# Patient Record
Sex: FEMALE | Race: BLACK OR AFRICAN AMERICAN | Employment: FULL TIME | URBAN - METROPOLITAN AREA
[De-identification: names, ages, dates, MRNs, and addresses within clinical notes are randomized per-mention and may not be internally consistent; named-entity substitution may affect disease eponyms.]

---

## 2017-01-10 ENCOUNTER — OFFICE VISIT (OUTPATIENT)
Dept: CARDIOLOGY CLINIC | Age: 52
End: 2017-01-10

## 2017-01-10 VITALS
HEART RATE: 83 BPM | BODY MASS INDEX: 30.58 KG/M2 | DIASTOLIC BLOOD PRESSURE: 100 MMHG | SYSTOLIC BLOOD PRESSURE: 159 MMHG | WEIGHT: 162 LBS | HEIGHT: 61 IN

## 2017-01-10 DIAGNOSIS — R07.89 OTHER CHEST PAIN: Primary | ICD-10-CM

## 2017-01-10 DIAGNOSIS — E66.9 OBESITY (BMI 30.0-34.9): ICD-10-CM

## 2017-01-10 DIAGNOSIS — R03.0 PREHYPERTENSION: ICD-10-CM

## 2017-01-10 DIAGNOSIS — R94.31 ABNORMAL EKG: ICD-10-CM

## 2017-01-10 DIAGNOSIS — Z82.49 FH: CAD (CORONARY ARTERY DISEASE): ICD-10-CM

## 2017-01-10 PROBLEM — R07.9 CHEST PAIN: Status: ACTIVE | Noted: 2017-01-10

## 2017-01-10 RX ORDER — NITROGLYCERIN 0.4 MG/1
0.4 TABLET SUBLINGUAL
Qty: 25 TAB | Refills: 0 | Status: SHIPPED | OUTPATIENT
Start: 2017-01-10

## 2017-01-10 RX ORDER — TRAMADOL HYDROCHLORIDE 50 MG/1
50 TABLET ORAL 3 TIMES DAILY
Qty: 20 TAB | Refills: 0 | Status: SHIPPED | OUTPATIENT
Start: 2017-01-10 | End: 2017-02-06 | Stop reason: SDUPTHER

## 2017-01-10 RX ORDER — CARVEDILOL 6.25 MG/1
6.25 TABLET ORAL 2 TIMES DAILY WITH MEALS
Qty: 60 TAB | Refills: 3 | Status: SHIPPED | OUTPATIENT
Start: 2017-01-10 | End: 2017-02-06 | Stop reason: SDUPTHER

## 2017-01-10 NOTE — PROGRESS NOTES
HISTORY OF PRESENT ILLNESS  Brittani Poole is a 49 y.o. female. New Patient   The history is provided by the patient and medical records. Associated symptoms include chest pain and shortness of breath. Pertinent negatives include no headaches. Chest Pain (Angina)    The history is provided by the patient. This is a new problem. The current episode started more than 1 week ago (1/16). The problem has been gradually worsening (2wks). Duration of episode(s) is 15 minutes. The problem occurs every several days. The pain is associated with rest, normal activity and movement. The pain is present in the substernal region. The quality of the pain is described as vice-like and tightness. The pain radiates to the left jaw, right jaw, mid back and left arm. Associated symptoms include diaphoresis (mild sweating), palpitations and shortness of breath. Pertinent negatives include no claudication, no cough, no dizziness, no fever, no headaches, no malaise/fatigue, no nausea, no orthopnea, no PND and no vomiting. She has tried nothing for the symptoms. Palpitations    The history is provided by the patient. This is a new problem. The current episode started more than 1 week ago (yrs). The problem is associated with nothing. Associated symptoms include diaphoresis (mild sweating), chest pain and shortness of breath. Pertinent negatives include no fever, no malaise/fatigue, no claudication, no orthopnea, no PND, no nausea, no vomiting, no headaches, no dizziness and no cough. Review of Systems   Constitutional: Positive for diaphoresis (mild sweating). Negative for chills, fever, malaise/fatigue and weight loss. HENT: Negative for nosebleeds. Eyes: Negative for discharge. Respiratory: Positive for shortness of breath. Negative for cough and wheezing. Cardiovascular: Positive for chest pain and palpitations. Negative for orthopnea, claudication, leg swelling and PND.    Gastrointestinal: Negative for diarrhea, nausea and vomiting. Genitourinary: Negative for dysuria and hematuria. Musculoskeletal: Negative for joint pain. Skin: Negative for rash. Neurological: Negative for dizziness, seizures, loss of consciousness and headaches. Endo/Heme/Allergies: Negative for polydipsia. Does not bruise/bleed easily. Psychiatric/Behavioral: Negative for depression and substance abuse. The patient does not have insomnia. No Known Allergies    Past Medical History   Diagnosis Date    Atrial fibrillation ([facility] [address] 1992    Thyroid disease        Family History   Problem Relation Age of Onset    Heart Attack Father 48    Stroke Father     Heart Surgery Father        Social History   Substance Use Topics    Smoking status: Former Smoker     Quit date: 1/10/1982    Smokeless tobacco: None    Alcohol use Yes      Comment: rare wine glass <1/month        Current Outpatient Prescriptions   Medication Sig    ASPIRIN (ASPIR-LOW PO) Take  by mouth daily. No current facility-administered medications for this visit. Past Surgical History   Procedure Laterality Date    Hx partial thyroidectomy  2014     nodule with trach shift       Diagnostic Studies:  I have reviewed the relevant tests done on the patient and show as follows  EKG tracings reviewed by me today. No flowsheet data found. Visit Vitals    BP (!) 159/100    Pulse 83    Ht 5' 1\" (1.549 m)    Wt 73.5 kg (162 lb)    BMI 30.61 kg/m2       Ms. Carolyn Delacruz has a reminder for a \"due or due soon\" health maintenance. I have asked that she contact her primary care provider for follow-up on this health maintenance. Physical Exam   Constitutional: She is oriented to person, place, and time. She appears well-developed and well-nourished. No distress. HENT:   Head: Normocephalic and atraumatic. Mouth/Throat: Normal dentition. Eyes: Right eye exhibits no discharge. Left eye exhibits no discharge. No scleral icterus. Neck: Neck supple.  No JVD present. Carotid bruit is not present. No thyromegaly present. Cardiovascular: Normal rate, regular rhythm, S1 normal, S2 normal, normal heart sounds and intact distal pulses. Exam reveals no gallop and no friction rub. No murmur heard. Pulmonary/Chest: Effort normal and breath sounds normal. She has no wheezes. She has no rales. She exhibits tenderness (lower sternal and right lower parasternal). Abdominal: Soft. She exhibits no mass. There is no tenderness. Musculoskeletal: She exhibits no edema. Lymphadenopathy:        Right cervical: No superficial cervical adenopathy present. Left cervical: No superficial cervical adenopathy present. Neurological: She is alert and oriented to person, place, and time. Skin: Skin is warm and dry. No rash noted. Psychiatric: She has a normal mood and affect. Her behavior is normal.       ASSESSMENT and PLAN      Amanda was seen today for new patient, chest pain (angina) and palpitations. Diagnoses and all orders for this visit:    Other chest pain  Comments:  atypical but poss new angina  try tramadol if asa not helping and get stress test  Orders:  -     AMB POC EKG ROUTINE W/ 12 LEADS, INTER & REP  -     nitroglycerin (NITROSTAT) 0.4 mg SL tablet; 1 Tab by SubLINGual route every five (5) minutes as needed for Chest Pain for up to 3 doses. -     traMADol (ULTRAM) 50 mg tablet; Take 1 Tab by mouth three (3) times daily. Max Daily Amount: 150 mg. Indications: PAIN  -     SCHEDULE NUCLEAR STUDY    Obesity (BMI 30.0-34. 9)  Comments:  Weight loss has been strongly encouraged by following dietary restrictions and an exercise routine. Prehypertension  Comments:  high today and has been in past  Orders:  -     carvedilol (COREG) 6.25 mg tablet; Take 1 Tab by mouth two (2) times daily (with meals).     FH: CAD (coronary artery disease)  Comments:  father with premature CAD    Abnormal EKG  Comments:  non sp T wave changes        Pertinent laboratory and test data reviewed and discussed with patient. See patient instructions also for other medical advice given    There are no discontinued medications.     Follow-up Disposition:  Return in about 2 weeks (around 1/24/2017), or if symptoms worsen or fail to improve, for post test.

## 2017-01-10 NOTE — PATIENT INSTRUCTIONS
There are no discontinued medications. Orders Placed This Encounter    SCHEDULE NUCLEAR STUDY     exercise    AMB POC EKG ROUTINE W/ 12 LEADS, INTER & REP     Order Specific Question:   Reason for Exam:     Answer:   chest pain    carvedilol (COREG) 6.25 mg tablet     Sig: Take 1 Tab by mouth two (2) times daily (with meals). Dispense:  60 Tab     Refill:  3    nitroglycerin (NITROSTAT) 0.4 mg SL tablet     Si Tab by SubLINGual route every five (5) minutes as needed for Chest Pain for up to 3 doses. Dispense:  25 Tab     Refill:  0    traMADol (ULTRAM) 50 mg tablet     Sig: Take 1 Tab by mouth three (3) times daily. Max Daily Amount: 150 mg. Indications: PAIN     Dispense:  20 Tab     Refill:  0          Body Mass Index: Care Instructions  Your Care Instructions    Body mass index (BMI) can help you see if your weight is raising your risk for health problems. It uses a formula to compare how much you weigh with how tall you are. A BMI between 18.5 and 24.9 is considered healthy. A BMI between 25 and 29.9 is considered overweight. A BMI of 30 or higher is considered obese. If your BMI is in the normal range, it means that you have a lower risk for weight-related health problems. If your BMI is in the overweight or obese range, you may be at increased risk for weight-related health problems, such as high blood pressure, heart disease, stroke, arthritis or joint pain, and diabetes. BMI is just one measure of your risk for weight-related health problems. You may be at higher risk for health problems if you are not active, you eat an unhealthy diet, or you drink too much alcohol or use tobacco products. Follow-up care is a key part of your treatment and safety. Be sure to make and go to all appointments, and call your doctor if you are having problems. It's also a good idea to know your test results and keep a list of the medicines you take. How can you care for yourself at home?   · Practice healthy eating habits. This includes eating plenty of fruits, vegetables, whole grains, lean protein, and low-fat dairy. · Get at least 30 minutes of exercise 5 days a week or more. Brisk walking is a good choice. You also may want to do other activities, such as running, swimming, cycling, or playing tennis or team sports. · Do not smoke. Smoking can increase your risk for health problems. If you need help quitting, talk to your doctor about stop-smoking programs and medicines. These can increase your chances of quitting for good. · Limit alcohol to 2 drinks a day for men and 1 drink a day for women. Too much alcohol can cause health problems. If you have a BMI higher than 25  · Your doctor may do other tests to check your risk for weight-related health problems. This may include measuring the distance around your waist. A waist measurement of more than 40 inches in men or 35 inches in women can increase the risk of weight-related health problems. · Talk with your doctor about steps you can take to stay healthy or improve your health. You may need to make lifestyle changes to lose weight and stay healthy, such as changing your diet and getting regular exercise. Where can you learn more? Go to http://deedee-jacklyn.info/. Enter S176 in the search box to learn more about \"Body Mass Index: Care Instructions. \"  Current as of: February 16, 2016  Content Version: 11.1  © 5439-7216 [employer]. Care instructions adapted under license by [employer] (which disclaims liability or warranty for this information). If you have questions about a medical condition or this instruction, always ask your healthcare professional. [place] [address] any warranty or liability for your use of this information.

## 2017-01-10 NOTE — MR AVS SNAPSHOT
Visit Information Date & Time Provider Department Dept. Phone Encounter #  
 1/10/2017  9:15 AM Audrey Mcdaniel MD [facility] ([place]) [phone] [id number] Follow-up Instructions Return in about 2 weeks (around 1/24/2017), or if symptoms worsen or fail to improve, for post test.  
  
Your Appointments 1/12/2017  9:30 AM  
PROCEDURE with CA NUC [facility] ([facility]) Appt Note: [address] [facility] [address]  
  
   
 [address] [id number] [address] [address]  
  
    
 1/27/2017  9:45 AM  
ESTABLISHED PATIENT with Audrey Mcdaniel MD  
[facility] ([facility]) Appt Note: 2 week post nuclear follow up  
 [address] [facility] [address]  
  
   
 [address] [id number] [address] [address] Upcoming Health Maintenance Date Due DTaP/Tdap/Td series (1 - Tdap) 3/7/1987 PAP AKA CERVICAL CYTOLOGY 3/7/1987 BREAST CANCER SCRN MAMMOGRAM 3/7/2016 FOBT Q 1 YEAR AGE 50-75 3/7/2016 INFLUENZA AGE 9 TO ADULT 8/1/2016 Allergies as of 1/10/2017  Review Complete On: 1/10/2017 By: Audrey Mcdaniel MD  
 No Known Allergies Current Immunizations  Never Reviewed No immunizations on file. Not reviewed this visit You Were Diagnosed With   
  
 Codes Comments Other chest pain    -  Primary ICD-10-CM: R07.89 ICD-9-CM: 786.59 atypical but poss new angina 
try tramadol if asa not helping and get stress test  
 Obesity (BMI 30.0-34.9)     ICD-10-CM: P73.0 ICD-9-CM: 278.00 Weight loss has been strongly encouraged by following dietary restrictions and an exercise routine. Prehypertension     ICD-10-CM: R03.0 ICD-9-CM: 796.2 high today and has been in past  
 FH: CAD (coronary artery disease)     ICD-10-CM: Z82.49 
ICD-9-CM: V17.3 father with premature CAD Abnormal EKG     ICD-10-CM: R94.31 
ICD-9-CM: 794.31 non sp T wave changes Vitals BP Pulse Height(growth percentile) Weight(growth percentile) BMI Smoking Status (!) 159/100 83 5' 1\" (1.549 m) 162 lb (73.5 kg) 30.61 kg/m2 Former Smoker Vitals History BMI and BSA Data Body Mass Index Body Surface Area  
 30.61 kg/m 2 1.78 m 2 Preferred Pharmacy Pharmacy Name [address] [facility] [phone] Your Updated Medication List  
  
   
This list is accurate as of: 1/10/17  9:41 AM.  Always use your most recent med list.  
  
  
  
  
 ASPIR-LOW PO Take  by mouth daily. carvedilol 6.25 mg tablet Commonly known as:  Phineas Hoar Take 1 Tab by mouth two (2) times daily (with meals). nitroglycerin 0.4 mg SL tablet Commonly known as:  NITROSTAT  
1 Tab by SubLINGual route every five (5) minutes as needed for Chest Pain for up to 3 doses. traMADol 50 mg tablet Commonly known as:  ULTRAM  
Take 1 Tab by mouth three (3) times daily. Max Daily Amount: 150 mg. Indications: PAIN Prescriptions Printed Refills  
 traMADol (ULTRAM) 50 mg tablet 0 Sig: Take 1 Tab by mouth three (3) times daily. Max Daily Amount: 150 mg. Indications: PAIN Class: Print Route: Oral  
  
Prescriptions Sent to Pharmacy Refills  
 carvedilol (COREG) 6.25 mg tablet 3 Sig: Take 1 Tab by mouth two (2) times daily (with meals). Class: Normal  
 Pharmacy: [id number] [address] [place] [address] Rd Ph #: [phone] Route: Oral  
 nitroglycerin (NITROSTAT) 0.4 mg SL tablet 0 Si Tab by SubLINGual route every five (5) minutes as needed for Chest Pain for up to 3 doses. Class: Normal  
 Pharmacy: [id number] [address] [place] [address] Rd Ph #: [phone] Route: SubLINGual  
  
We Performed the Following AMB POC EKG ROUTINE W/ 12 LEADS, INTER & REP [16879 CPT(R)] SCHEDULE NUCLEAR STUDY [IUT1544 Custom] Comments: exercise Follow-up Instructions Return in about 2 weeks (around 2017), or if symptoms worsen or fail to improve, for post test.  
  
  
Patient Instructions There are no discontinued medications. Orders Placed This Encounter  SCHEDULE NUCLEAR STUDY  
  exercise  AMB POC EKG ROUTINE  LEADS, INTER & REP Order Specific Question:   Reason for Exam: Answer:   chest pain  carvedilol (COREG) 6.25 mg tablet Sig: Take 1 Tab by mouth two (2) times daily (with meals). Dispense:  60 Tab Refill:  3  
 nitroglycerin (NITROSTAT) 0.4 mg SL tablet Si Tab by SubLINGual route every five (5) minutes as needed for Chest Pain for up to 3 doses. Dispense:  25 Tab Refill:  0  
 traMADol (ULTRAM) 50 mg tablet Sig: Take 1 Tab by mouth three (3) times daily. Max Daily Amount: 150 mg. Indications: PAIN Dispense:  20 Tab Refill:  0 Body Mass Index: Care Instructions Your Care Instructions Body mass index (BMI) can help you see if your weight is raising your risk for health problems. It uses a formula to compare how much you weigh with how tall you are. A BMI between 18.5 and 24.9 is considered healthy. A BMI between 25 and 29.9 is considered overweight. A BMI of 30 or higher is considered obese. If your BMI is in the normal range, it means that you have a lower risk for weight-related health problems. If your BMI is in the overweight or obese range, you may be at increased risk for weight-related health problems, such as high blood pressure, heart disease, stroke, arthritis or joint pain, and diabetes. BMI is just one measure of your risk for weight-related health problems. You may be at higher risk for health problems if you are not active, you eat an unhealthy diet, or you drink too much alcohol or use tobacco products. Follow-up care is a key part of your treatment and safety.  Be sure to make and go to all appointments, and call your doctor if you are having problems. It's also a good idea to know your test results and keep a list of the medicines you take. How can you care for yourself at home? · Practice healthy eating habits. This includes eating plenty of fruits, vegetables, whole grains, lean protein, and low-fat dairy. · Get at least 30 minutes of exercise 5 days a week or more. Brisk walking is a good choice. You also may want to do other activities, such as running, swimming, cycling, or playing tennis or team sports. · Do not smoke. Smoking can increase your risk for health problems. If you need help quitting, talk to your doctor about stop-smoking programs and medicines. These can increase your chances of quitting for good. · Limit alcohol to 2 drinks a day for men and 1 drink a day for women. Too much alcohol can cause health problems. If you have a BMI higher than 25 · Your doctor may do other tests to check your risk for weight-related health problems. This may include measuring the distance around your waist. A waist measurement of more than 40 inches in men or 35 inches in women can increase the risk of weight-related health problems. · Talk with your doctor about steps you can take to stay healthy or improve your health. You may need to make lifestyle changes to lose weight and stay healthy, such as changing your diet and getting regular exercise. Where can you learn more? Go to http://deedee-jacklyn.info/. Enter S176 in the search box to learn more about \"Body Mass Index: Care Instructions. \" Current as of: February 16, 2016 Content Version: 11.1 © 0305-7176 [employer]. Care instructions adapted under license by [employer] (which disclaims liability or warranty for this information).  If you have questions about a medical condition or this instruction, always ask your healthcare professional. [employer] disclaims any warranty or liability for your use of this information. Introducing [facility]! [facility] introduces sofatronic patient portal. Now you can access parts of your medical record, email your doctor's office, and request medication refills online. 1. In your internet browser, go to [email or web address] 2. Click on the First Time User? Click Here link in the Sign In box. You will see the New Member Sign Up page. 3. Enter your sofatronic Access Code exactly as it appears below. You will not need to use this code after youve completed the sign-up process. If you do not sign up before the expiration date, you must request a new code. · sofatronic Access Code: [email or web address] Expires: 4/10/2017  9:40 AM 
 
4. Enter the last four digits of your Social Security Number (xxxx) and Date of Birth (mm/dd/yyyy) as indicated and click Submit. You will be taken to the next sign-up page. 5. Create a sofatronic ID. This will be your sofatronic login ID and cannot be changed, so think of one that is secure and easy to remember. 6. Create a sofatronic password. You can change your password at any time. 7. Enter your Password Reset Question and Answer. This can be used at a later time if you forget your password. 8. Enter your e-mail address. You will receive e-mail notification when new information is available in [address] 9. Click Sign Up. You can now view and download portions of your medical record. 10. Click the Download Summary menu link to download a portable copy of your medical information. If you have questions, please visit the Frequently Asked Questions section of the sofatronic website. Remember, sofatronic is NOT to be used for urgent needs. For medical emergencies, dial 911. Now available from your iPhone and Android! Please provide this summary of care documentation to your next provider. Your primary care clinician is listed as LAKISHA HERNÁNDEZ. If you have any questions after today's visit, please call [phone].

## 2017-01-12 ENCOUNTER — CLINICAL SUPPORT (OUTPATIENT)
Dept: CARDIOLOGY CLINIC | Age: 52
End: 2017-01-12

## 2017-01-12 DIAGNOSIS — R03.0 PREHYPERTENSION: ICD-10-CM

## 2017-01-12 DIAGNOSIS — Z82.49 FH: CAD (CORONARY ARTERY DISEASE): ICD-10-CM

## 2017-01-12 DIAGNOSIS — R94.31 ABNORMAL EKG: ICD-10-CM

## 2017-01-12 DIAGNOSIS — R07.89 OTHER CHEST PAIN: Primary | ICD-10-CM

## 2017-01-12 NOTE — PROGRESS NOTES
[facility]  [address], [address] [place] [address], [place], [address] [place] [address]  [phone] [place] [phone] [place]      Name: Mariya Luna         MRN#: [id number]      Gender: female       YOB: 1965             Date of Rest/Stress Images: 1/12/2017   Referring Provider: Bushra Marino MD  Ordering Provider: Jessica Haney. Bud Evans MD, [facility]  Technologist: Sandie Joshua. Julieta PHILLIPS, C.N.M.T. Diagnosis:   1. Other chest pain    2. Prehypertension    3. Abnormal EKG    4. FH: CAD (coronary artery disease)            Rest/Stress Myoview SPECT Myocardial Perfusion Imaging with  Exercise Stress and Gated SPECT Imaging      PROCEDURE:      Myocardial perfusion imaging was performed at rest approximately 30 mins following the intravenous injection,(Right hand ) of 12.2 mCi of Tc99m Myoview for evaluation of myocardial function and perfusion at rest.     Baseline Data:   Baseline heart rate is 92 beats per minute. Baseline blood pressure is 140/86. Baseline EKG shows sinus rhythm, nonspecific T-wave abnormalities. Exercise Data:    The patient exercised using standard Dylon protocol. She exercised for a total of 7 minutes and 49 seconds, achieving 9.0 METS. Exercise was stopped due to fatigue. Maximum heart rate is 151, which is 88% of predicted maximal.  Maximal blood pressure is 200/88. EKG shows T-wave inversions in lead III and aVF without any ST segment deviation. Conclusion:   1. Nondiagnostic T-wave changes for ischemia at 88% of predicted maximal heart rate. 2. Normal functional capacity. 3. Appropriate heart rate and blood pressure response. 4. No symptoms or arrhythmias seen. 5. Perfusion image report to follow. Exercise: At peak exercise, the patient was injected intravenously with 35.1 mCi of Tc99m Myoview and exercise was continued for 15 to 45 seconds.  The stress images were obtained approximately 30 minutes post tracer injection. The stress SPECT study was gated to evaluate regional wall motion and calculate the left ventricular ejection fraction. The data was reconstructed in the short, horizontal long and vertical long axis views and tomographic slices were generated. NUCLEAR IMAGING:     Findings:   1. Stress images reveal normal Myoview distrubution in all the LV segments in short axis, vertical and horizontal long axis views. 2. Resting images have a normal uptake. 3. Gated images reveal normal wall motion and the ejection fraction is calculated to be 90%. Conclusion:   1. Normal perfusion scan. 2. Normal wall motion and ejection fraction. 3. Low risk scan. Thank you for the referral.    E-signed and Interpreting Physician:    Kalpesh Cain.  Fawad Rushing MD, [facility]     Date of interpretation: 1/12/2017  Date of final report: 1/12/2017

## 2017-01-27 ENCOUNTER — OFFICE VISIT (OUTPATIENT)
Dept: CARDIOLOGY CLINIC | Age: 52
End: 2017-01-27

## 2017-01-27 VITALS
DIASTOLIC BLOOD PRESSURE: 81 MMHG | SYSTOLIC BLOOD PRESSURE: 128 MMHG | BODY MASS INDEX: 30.4 KG/M2 | WEIGHT: 161 LBS | HEIGHT: 61 IN | HEART RATE: 91 BPM

## 2017-01-27 DIAGNOSIS — R03.0 PREHYPERTENSION: ICD-10-CM

## 2017-01-27 DIAGNOSIS — Z82.49 FH: CAD (CORONARY ARTERY DISEASE): ICD-10-CM

## 2017-01-27 DIAGNOSIS — R00.2 PALPITATIONS: ICD-10-CM

## 2017-01-27 DIAGNOSIS — E66.9 OBESITY (BMI 30.0-34.9): ICD-10-CM

## 2017-01-27 DIAGNOSIS — R07.89 OTHER CHEST PAIN: Primary | ICD-10-CM

## 2017-01-27 RX ORDER — GLUCOSAMINE SULFATE 1500 MG
POWDER IN PACKET (EA) ORAL DAILY
COMMUNITY

## 2017-01-27 NOTE — PROGRESS NOTES
1. Have you been to the ER, urgent care clinic since your last visit? Hospitalized since your last visit? No    2. Have you seen or consulted any other health care providers outside of the [address] [place] [address] since your last visit? Include any pap smears or colon screening. No     3. Since your last visit, have you had any of the following symptoms?      dizziness. 4.  Have you had any blood work, X-rays or cardiac testing? No              5.  Where do you normally have your labs drawn? [facility]    6. Do you need any refills today?    No

## 2017-01-27 NOTE — PATIENT INSTRUCTIONS
After the recommended changes have been made in blood pressure medicines, patient advised to keep BP/HR(pulse rate) chart twice daily and bring us results in next 2 weeks or so. Patient may send the results via \"My Chart\" if desired. Please rest for 5-10 minutes before checking blood pressure  There are no discontinued medications. No orders of the defined types were placed in this encounter. Body Mass Index: Care Instructions  Your Care Instructions    Body mass index (BMI) can help you see if your weight is raising your risk for health problems. It uses a formula to compare how much you weigh with how tall you are. A BMI between 18.5 and 24.9 is considered healthy. A BMI between 25 and 29.9 is considered overweight. A BMI of 30 or higher is considered obese. If your BMI is in the normal range, it means that you have a lower risk for weight-related health problems. If your BMI is in the overweight or obese range, you may be at increased risk for weight-related health problems, such as high blood pressure, heart disease, stroke, arthritis or joint pain, and diabetes. BMI is just one measure of your risk for weight-related health problems. You may be at higher risk for health problems if you are not active, you eat an unhealthy diet, or you drink too much alcohol or use tobacco products. Follow-up care is a key part of your treatment and safety. Be sure to make and go to all appointments, and call your doctor if you are having problems. It's also a good idea to know your test results and keep a list of the medicines you take. How can you care for yourself at home? · Practice healthy eating habits. This includes eating plenty of fruits, vegetables, whole grains, lean protein, and low-fat dairy. · Get at least 30 minutes of exercise 5 days a week or more. Brisk walking is a good choice.  You also may want to do other activities, such as running, swimming, cycling, or playing tennis or team sports. · Do not smoke. Smoking can increase your risk for health problems. If you need help quitting, talk to your doctor about stop-smoking programs and medicines. These can increase your chances of quitting for good. · Limit alcohol to 2 drinks a day for men and 1 drink a day for women. Too much alcohol can cause health problems. If you have a BMI higher than 25  · Your doctor may do other tests to check your risk for weight-related health problems. This may include measuring the distance around your waist. A waist measurement of more than 40 inches in men or 35 inches in women can increase the risk of weight-related health problems. · Talk with your doctor about steps you can take to stay healthy or improve your health. You may need to make lifestyle changes to lose weight and stay healthy, such as changing your diet and getting regular exercise. Where can you learn more? Go to http://deedee-jacklyn.info/. Enter S176 in the search box to learn more about \"Body Mass Index: Care Instructions. \"  Current as of: February 16, 2016  Content Version: 11.1  © 5720-7681 [employer]. Care instructions adapted under license by [employer] (which disclaims liability or warranty for this information). If you have questions about a medical condition or this instruction, always ask your healthcare professional. [address] any warranty or liability for your use of this information.

## 2017-01-27 NOTE — LETTER
Kory [place]don 1965 
 
1/27/2017 Dear Apolonia Cardona MD 
 
I had the pleasure of evaluating  Ms. Doris Scott in office today. Below are the relevant portions of my assessment and plan of care. ICD-10-CM ICD-9-CM 1. Other chest pain R07.89 786.59   
 resolved, normal stress test 
ok for surgery as needed 2. Palpitations R00.2 785.1 1/17 rare but tody lasted 1 hr 
d/w pt- will wait for more testing 3. Prehypertension R03.0 796.2 1/17 normal; 
12/16 high today and has been in past 
[facility] home chart 4. Obesity (BMI 30.0-34. 9) E66.9 278.00 Weight loss has been strongly encouraged by following dietary restrictions and an exercise routine. 5. FH: CAD (coronary artery disease) Z82.49 V17.3 Current Outpatient Prescriptions Medication Sig Dispense Refill  cholecalciferol (VITAMIN D3) 1,000 unit cap Take  by mouth daily.  triamterene-hydroCHLOROthiazide (DYAZIDE) 50-25 mg per capsule Take  by mouth every morning.  ASPIRIN (ASPIR-LOW PO) Take  by mouth daily.  carvedilol (COREG) 6.25 mg tablet Take 1 Tab by mouth two (2) times daily (with meals). 60 Tab 3  
 nitroglycerin (NITROSTAT) 0.4 mg SL tablet 1 Tab by SubLINGual route every five (5) minutes as needed for Chest Pain for up to 3 doses. 25 Tab 0  
 traMADol (ULTRAM) 50 mg tablet Take 1 Tab by mouth three (3) times daily. Max Daily Amount: 150 mg. Indications: PAIN 20 Tab 0 Orders Placed This Encounter  cholecalciferol (VITAMIN D3) 1,000 unit cap Sig: Take  by mouth daily.  triamterene-hydroCHLOROthiazide (DYAZIDE) 50-25 mg per capsule Sig: Take  by mouth every morning. If you have questions, please do not hesitate to call me. I look forward to following Ms. Doris Scott along with you. Sincerely, Reina Recinos MD

## 2017-01-27 NOTE — MR AVS SNAPSHOT
Visit Information Date & Time Provider Department Dept. Phone Encounter #  
 1/27/2017  9:45 AM Antwon Vela MD [facility] [phone] Follow-up Instructions Return in about 6 months (around 7/27/2017), or if symptoms worsen or fail to improve. Your Appointments 7/14/2017  9:45 AM  
ESTABLISHED PATIENT with Antwon Vela MD  
[facility] ([address]) Appt Note: 6 month follow up  
 [address] [facility] [address]  
  
   
 [address] [address] [address] [address] Upcoming Health Maintenance Date Due DTaP/Tdap/Td series (1 - Tdap) 3/7/1987 PAP AKA CERVICAL CYTOLOGY 3/7/1987 BREAST CANCER SCRN MAMMOGRAM 3/7/2016 FOBT Q 1 YEAR AGE 50-75 3/7/2016 INFLUENZA AGE 9 TO ADULT 8/1/2016 Allergies as of 1/27/2017  Review Complete On: 1/27/2017 By: Antwon Vela MD  
 No Known Allergies Current Immunizations  Never Reviewed No immunizations on file. Not reviewed this visit You Were Diagnosed With   
  
 Codes Comments Other chest pain    -  Primary ICD-10-CM: R07.89 ICD-9-CM: 786.59 resolved, normal stress test 
ok for surgery as needed Palpitations     ICD-10-CM: R00.2 ICD-9-CM: 785.1 1/17 rare but tody lasted 1 hr 
d/w pt- will wait for more testing Prehypertension     ICD-10-CM: R03.0 ICD-9-CM: 796.2 1/17 normal; 
12/16 high today and has been in past 
[facility] home chart Obesity (BMI 30.0-34.9)     ICD-10-CM: M96.4 ICD-9-CM: 278.00 Weight loss has been strongly encouraged by following dietary restrictions and an exercise routine. FH: CAD (coronary artery disease)     ICD-10-CM: Z82.49 
ICD-9-CM: V17.3 Vitals BP Pulse Height(growth percentile) Weight(growth percentile) BMI Smoking Status 128/81 91 5' 1\" (1.549 m) 161 lb (73 kg) 30.42 kg/m2 Former Smoker Vitals History BMI and BSA Data Body Mass Index Body Surface Area  
 30.42 kg/m 2 1.77 m 2 Preferred Pharmacy Pharmacy Name Phone [address] [place] [address] [facility] [phone] Your Updated Medication List  
  
   
This list is accurate as of: 1/27/17 10:55 AM.  Always use your most recent med list.  
  
  
  
  
 ASPIR-LOW PO Take  by mouth daily. carvedilol 6.25 mg tablet Commonly known as:  Susi Spry Take 1 Tab by mouth two (2) times daily (with meals). nitroglycerin 0.4 mg SL tablet Commonly known as:  NITROSTAT  
1 Tab by SubLINGual route every five (5) minutes as needed for Chest Pain for up to 3 doses. traMADol 50 mg tablet Commonly known as:  ULTRAM  
Take 1 Tab by mouth three (3) times daily. Max Daily Amount: 150 mg. Indications: PAIN  
  
 triamterene-hydroCHLOROthiazide 50-25 mg per capsule Commonly known as:  May Lies Take  by mouth every morning. VITAMIN D3 1,000 unit Cap Generic drug:  cholecalciferol Take  by mouth daily. Follow-up Instructions Return in about 6 months (around 7/27/2017), or if symptoms worsen or fail to improve. Patient Instructions After the recommended changes have been made in blood pressure medicines, patient advised to keep BP/HR(pulse rate) chart twice daily and bring us results in next 2 weeks or so. Patient may send the results via \"My Chart\" if desired. Please rest for 5-10 minutes before checking blood pressure There are no discontinued medications. No orders of the defined types were placed in this encounter. Body Mass Index: Care Instructions Your Care Instructions Body mass index (BMI) can help you see if your weight is raising your risk for health problems. It uses a formula to compare how much you weigh with how tall you are. A BMI between 18.5 and 24.9 is considered healthy. A BMI between 25 and 29.9 is considered overweight.  A BMI of 30 or higher is considered obese. If your BMI is in the normal range, it means that you have a lower risk for weight-related health problems. If your BMI is in the overweight or obese range, you may be at increased risk for weight-related health problems, such as high blood pressure, heart disease, stroke, arthritis or joint pain, and diabetes. BMI is just one measure of your risk for weight-related health problems. You may be at higher risk for health problems if you are not active, you eat an unhealthy diet, or you drink too much alcohol or use tobacco products. Follow-up care is a key part of your treatment and safety. Be sure to make and go to all appointments, and call your doctor if you are having problems. It's also a good idea to know your test results and keep a list of the medicines you take. How can you care for yourself at home? · Practice healthy eating habits. This includes eating plenty of fruits, vegetables, whole grains, lean protein, and low-fat dairy. · Get at least 30 minutes of exercise 5 days a week or more. Brisk walking is a good choice. You also may want to do other activities, such as running, swimming, cycling, or playing tennis or team sports. · Do not smoke. Smoking can increase your risk for health problems. If you need help quitting, talk to your doctor about stop-smoking programs and medicines. These can increase your chances of quitting for good. · Limit alcohol to 2 drinks a day for men and 1 drink a day for women. Too much alcohol can cause health problems. If you have a BMI higher than 25 · Your doctor may do other tests to check your risk for weight-related health problems. This may include measuring the distance around your waist. A waist measurement of more than 40 inches in men or 35 inches in women can increase the risk of weight-related health problems.  
· Talk with your doctor about steps you can take to stay healthy or improve your health. You may need to make lifestyle changes to lose weight and stay healthy, such as changing your diet and getting regular exercise. Where can you learn more? Go to http://deedee-jacklyn.info/. Enter S176 in the search box to learn more about \"Body Mass Index: Care Instructions. \" Current as of: February 16, 2016 Content Version: 11.1 © 3081-1871 [employer]. Care instructions adapted under license by [employer] (which disclaims liability or warranty for this information). If you have questions about a medical condition or this instruction, always ask your healthcare professional. [place] [address] any warranty or liability for your use of this information. Introducing [facility] [place] [facility]! Dear Delvin So: Thank you for requesting a [employer] account. Our records indicate that you already have an active [employer] account. You can access your account anytime at [email or web address] Did you know that you can access your hospital and ER discharge instructions at any time in [employer]? You can also review all of your test results from your hospital stay or ER visit. Additional Information If you have questions, please visit the Frequently Asked Questions section of the [employer] website at [email or web address] Remember, [employer] is NOT to be used for urgent needs. For medical emergencies, dial 911. Now available from your iPhone and Android! Please provide this summary of care documentation to your next provider. Your primary care clinician is listed as LAKISHA HERNÁNDEZ. If you have any questions after today's visit, please call [phone].

## 2017-01-27 NOTE — LETTER
2017 10:54 AM 
 
Ms. [facility] 
[address] 
[place] [id number] Dear Romain Bull: 
 
Re: [facility] : 1965 Ms. [facility] is cleared from a cardiac standpoint for thyroid surgery and colonoscopy. If you have any questions or any further assistance is needed please contact our office. Sincerely, Allan Wilson MD 
  
cc:  Mounika Lyles MD

## 2017-01-27 NOTE — PROGRESS NOTES
HISTORY OF PRESENT ILLNESS  Indira Ratliff is a 49 y.o. female. Chest Pain (Angina)    The history is provided by the patient. This is a new problem. The current episode started more than 1 week ago (1/16). The problem has been resolved (2wks). Duration of episode(s) is 15 minutes. The problem occurs every several days. The pain is associated with rest, normal activity and movement. The pain is present in the substernal region. The quality of the pain is described as vice-like and tightness. The pain radiates to the left jaw, right jaw, mid back and left arm. Associated symptoms include diaphoresis (mild sweating- rarely), dizziness, palpitations and shortness of breath. Pertinent negatives include no claudication, no cough, no fever, no headaches, no malaise/fatigue, no nausea, no orthopnea, no PND and no vomiting. She has tried nothing for the symptoms. Palpitations    The history is provided by the patient. This is a new problem. The current episode started more than 1 week ago (yrs). The problem has been rapidly improving. On average, each episode lasts 1 hour (happened today, woke up for bathroom). The problem is associated with nothing. Associated symptoms include diaphoresis (mild sweating- rarely), dizziness and shortness of breath. Pertinent negatives include no fever, no malaise/fatigue, no chest pain, no claudication, no orthopnea, no PND, no nausea, no vomiting, no headaches and no cough. Review of Systems   Constitutional: Positive for diaphoresis (mild sweating- rarely). Negative for chills, fever, malaise/fatigue and weight loss. HENT: Negative for nosebleeds. Eyes: Negative for discharge. Respiratory: Positive for shortness of breath. Negative for cough and wheezing. Cardiovascular: Positive for palpitations. Negative for chest pain, orthopnea, claudication, leg swelling and PND. Gastrointestinal: Negative for diarrhea, nausea and vomiting.    Genitourinary: Negative for dysuria and hematuria. Musculoskeletal: Negative for joint pain. Skin: Negative for rash. Neurological: Positive for dizziness. Negative for seizures, loss of consciousness and headaches. Endo/Heme/Allergies: Negative for polydipsia. Does not bruise/bleed easily. Psychiatric/Behavioral: Negative for depression and substance abuse. The patient does not have insomnia. No Known Allergies    Past Medical History   Diagnosis Date    Atrial fibrillation ([address] 1992    Thyroid disease        Family History   Problem Relation Age of Onset    Heart Attack Father 48    Stroke Father     Heart Surgery Father        Social History   Substance Use Topics    Smoking status: Former Smoker     Quit date: 1/10/1982    Smokeless tobacco: None    Alcohol use Yes      Comment: rare wine glass <1/month        Current Outpatient Prescriptions   Medication Sig    cholecalciferol (VITAMIN D3) 1,000 unit cap Take  by mouth daily.  triamterene-hydroCHLOROthiazide (DYAZIDE) 50-25 mg per capsule Take  by mouth every morning.  ASPIRIN (ASPIR-LOW PO) Take  by mouth daily.  carvedilol (COREG) 6.25 mg tablet Take 1 Tab by mouth two (2) times daily (with meals).  nitroglycerin (NITROSTAT) 0.4 mg SL tablet 1 Tab by SubLINGual route every five (5) minutes as needed for Chest Pain for up to 3 doses.  traMADol (ULTRAM) 50 mg tablet Take 1 Tab by mouth three (3) times daily. Max Daily Amount: 150 mg. Indications: PAIN     No current facility-administered medications for this visit. Past Surgical History   Procedure Laterality Date    Hx partial thyroidectomy  2014     nodule with trach shift       Diagnostic Studies:  I have reviewed the relevant tests done on the patient and show as follows  EKG tracings reviewed by me today. No flowsheet data found. 1/17 Nuc Stress  Conclusion:   1. Normal perfusion scan. 2. Normal wall motion and ejection fraction. 3. Low risk scan.     Visit Vitals    /81    Pulse 91    Ht 5' 1\" (1.549 m)    Wt 73 kg (161 lb)    BMI 30.42 kg/m2       Ms. Guadalupe Wells has a reminder for a \"due or due soon\" health maintenance. I have asked that she contact her primary care provider for follow-up on this health maintenance. Physical Exam   Constitutional: She is oriented to person, place, and time. She appears well-developed and well-nourished. No distress. HENT:   Head: Normocephalic and atraumatic. Mouth/Throat: Normal dentition. Eyes: Right eye exhibits no discharge. Left eye exhibits no discharge. No scleral icterus. Neck: Neck supple. No JVD present. Carotid bruit is not present. No thyromegaly present. Cardiovascular: Normal rate, regular rhythm, S1 normal, S2 normal, normal heart sounds and intact distal pulses. Exam reveals no gallop and no friction rub. No murmur heard. Pulmonary/Chest: Effort normal and breath sounds normal. She has no wheezes. She has no rales. She exhibits tenderness (lower sternal and right lower parasternal). Abdominal: Soft. She exhibits no mass. There is no tenderness. Musculoskeletal: She exhibits no edema. Lymphadenopathy:        Right cervical: No superficial cervical adenopathy present. Left cervical: No superficial cervical adenopathy present. Neurological: She is alert and oriented to person, place, and time. Skin: Skin is warm and dry. No rash noted. Psychiatric: She has a normal mood and affect. Her behavior is normal.       ASSESSMENT and PLAN        Amanda was seen today for coronary artery disease. Diagnoses and all orders for this visit:    Other chest pain  Comments:  resolved, normal stress test  ok for surgery as needed    Palpitations  Comments:  1/17 rare but tody lasted 1 hr  d/w pt- will wait for more testing    Prehypertension  Comments:  1/17 normal;  12/16 high today and has been in past  [facility] home chart    Obesity (BMI 30.0-34. 9)  Comments:  Weight loss has been strongly encouraged by following dietary restrictions and an exercise routine. FH: CAD (coronary artery disease)        Pertinent laboratory and test data reviewed and discussed with patient. See patient instructions also for other medical advice given    There are no discontinued medications. Follow-up Disposition:  Return in about 6 months (around 7/27/2017), or if symptoms worsen or fail to improve.

## 2017-02-06 DIAGNOSIS — R03.0 PREHYPERTENSION: ICD-10-CM

## 2017-02-06 DIAGNOSIS — R07.89 OTHER CHEST PAIN: ICD-10-CM

## 2017-02-06 RX ORDER — TRAMADOL HYDROCHLORIDE 50 MG/1
50 TABLET ORAL 3 TIMES DAILY
Qty: 20 TAB | Refills: 0 | Status: SHIPPED | OUTPATIENT
Start: 2017-02-06

## 2017-02-06 RX ORDER — CARVEDILOL 6.25 MG/1
6.25 TABLET ORAL 2 TIMES DAILY WITH MEALS
Qty: 60 TAB | Refills: 6 | Status: SHIPPED | OUTPATIENT
Start: 2017-02-06 | End: 2017-08-08 | Stop reason: ALTCHOICE

## 2017-03-23 DIAGNOSIS — R03.0 PREHYPERTENSION: Primary | ICD-10-CM

## 2017-03-23 NOTE — TELEPHONE ENCOUNTER
Patient called to discuss the  of Triamterene/HCTZ will no longer be manufacturing the medication. She will complete the supply that she has now , then change to aldactone 25mg and HCTZ 25mg take one tablet of each by mouth daily. She was also instructed that BMP needed to be drawn on day 3, day 7, and day30. Labs ordered and will await the patient at the . She voices understanding and acceptance of this advice and will call back if any further questions or concerns.

## 2017-03-24 RX ORDER — HYDROCHLOROTHIAZIDE 25 MG/1
25 TABLET ORAL DAILY
Qty: 30 TAB | Refills: 0 | Status: SHIPPED | OUTPATIENT
Start: 2017-03-24 | End: 2017-05-26 | Stop reason: SDUPTHER

## 2017-03-24 RX ORDER — SPIRONOLACTONE 25 MG/1
25 TABLET ORAL DAILY
Qty: 30 TAB | Refills: 0 | Status: SHIPPED | OUTPATIENT
Start: 2017-03-24 | End: 2017-05-26 | Stop reason: SDUPTHER

## 2017-03-31 DIAGNOSIS — R03.0 PREHYPERTENSION: ICD-10-CM

## 2017-04-04 DIAGNOSIS — R03.0 PREHYPERTENSION: ICD-10-CM

## 2017-04-07 LAB
ANION GAP SERPL CALC-SCNC: 14.1 MMOL/L
BUN SERPL-MCNC: 9 MG/DL (ref 6–22)
CALCIUM SERPL-MCNC: 9.9 MG/DL (ref 8.4–10.5)
CHLORIDE SERPL-SCNC: 97 MMOL/L (ref 98–110)
CO2 SERPL-SCNC: 28 MMOL/L (ref 20–32)
CREAT SERPL-MCNC: 0.6 MG/DL (ref 0.5–1.2)
GFRAA, 66117: >60
GFRNA, 66118: >60
GLUCOSE SERPL-MCNC: 143 MG/DL (ref 65–99)
POTASSIUM SERPL-SCNC: 3.7 MMOL/L (ref 3.5–5.5)
SODIUM SERPL-SCNC: 139 MMOL/L (ref 133–145)

## 2017-04-27 ENCOUNTER — TELEPHONE (OUTPATIENT)
Dept: CARDIOLOGY CLINIC | Age: 52
End: 2017-04-27

## 2017-04-27 LAB
AMBIG ABBREV BMP8 DEFAULT, 977205: NORMAL
BUN SERPL-MCNC: 7 MG/DL (ref 6–24)
BUN/CREAT SERPL: 12 (ref 9–23)
CALCIUM SERPL-MCNC: 9.3 MG/DL (ref 8.7–10.2)
CHLORIDE SERPL-SCNC: 97 MMOL/L (ref 96–106)
CO2 SERPL-SCNC: 24 MMOL/L (ref 18–29)
CREAT SERPL-MCNC: 0.57 MG/DL (ref 0.57–1)
GLUCOSE SERPL-MCNC: 202 MG/DL (ref 65–99)
POTASSIUM SERPL-SCNC: 4.2 MMOL/L (ref 3.5–5.2)
SODIUM SERPL-SCNC: 137 MMOL/L (ref 134–144)

## 2017-04-27 NOTE — TELEPHONE ENCOUNTER
----- Message from Yasmeen Kearns MD sent at 4/27/2017  9:00 AM EDT -----  Please contact patient and do the following asap    Fax to PCP for increased glucose

## 2017-05-01 DIAGNOSIS — R03.0 PREHYPERTENSION: ICD-10-CM

## 2017-05-26 ENCOUNTER — TELEPHONE (OUTPATIENT)
Dept: CARDIOLOGY CLINIC | Age: 52
End: 2017-05-26

## 2017-05-26 DIAGNOSIS — R03.0 PREHYPERTENSION: Primary | ICD-10-CM

## 2017-05-26 DIAGNOSIS — R03.0 PREHYPERTENSION: ICD-10-CM

## 2017-05-26 RX ORDER — SPIRONOLACTONE 25 MG/1
25 TABLET ORAL DAILY
Qty: 90 TAB | Refills: 0 | Status: SHIPPED | OUTPATIENT
Start: 2017-05-26 | End: 2019-04-19 | Stop reason: SDUPTHER

## 2017-05-26 RX ORDER — HYDROCHLOROTHIAZIDE 25 MG/1
25 TABLET ORAL DAILY
Qty: 90 TAB | Refills: 0 | Status: SHIPPED | OUTPATIENT
Start: 2017-05-26 | End: 2017-08-08 | Stop reason: SDUPTHER

## 2017-05-26 NOTE — TELEPHONE ENCOUNTER
Patient called to discuss medication changes from Dr. Sharri Ramachandran. She will change triamterene/hctz to aldactazide 25/25, and have labs drawn in one week and one month after starting new medication. She voices understanding and acceptance of this advice and will call back if any further questions or concerns.

## 2017-05-26 NOTE — TELEPHONE ENCOUNTER
Patient returning call, insurance will not cover aldactazide 25/25, patient will continue taking aldactone and HCTZ separately . She voices understanding and acceptance of this advice and will call back if any further questions or concerns. Would you like to change it to a different med? Please advise. All due to discontinuation of triamterene/HCTZ from .

## 2017-06-02 DIAGNOSIS — R03.0 PREHYPERTENSION: ICD-10-CM

## 2017-06-09 DIAGNOSIS — R03.0 PREHYPERTENSION: ICD-10-CM

## 2017-08-05 LAB
AMBIG ABBREV BMP8 DEFAULT, 977205: NORMAL
BUN SERPL-MCNC: 6 MG/DL (ref 6–24)
BUN/CREAT SERPL: 11 (ref 9–23)
CALCIUM SERPL-MCNC: 9.9 MG/DL (ref 8.7–10.2)
CHLORIDE SERPL-SCNC: 97 MMOL/L (ref 96–106)
CO2 SERPL-SCNC: 25 MMOL/L (ref 18–29)
CREAT SERPL-MCNC: 0.57 MG/DL (ref 0.57–1)
GLUCOSE SERPL-MCNC: 190 MG/DL (ref 65–99)
POTASSIUM SERPL-SCNC: 4 MMOL/L (ref 3.5–5.2)
SODIUM SERPL-SCNC: 139 MMOL/L (ref 134–144)

## 2017-08-08 ENCOUNTER — OFFICE VISIT (OUTPATIENT)
Dept: CARDIOLOGY CLINIC | Age: 52
End: 2017-08-08

## 2017-08-08 VITALS
SYSTOLIC BLOOD PRESSURE: 133 MMHG | DIASTOLIC BLOOD PRESSURE: 81 MMHG | BODY MASS INDEX: 30.96 KG/M2 | WEIGHT: 164 LBS | HEIGHT: 61 IN

## 2017-08-08 DIAGNOSIS — E66.9 OBESITY (BMI 30.0-34.9): ICD-10-CM

## 2017-08-08 DIAGNOSIS — I48.0 PAROXYSMAL ATRIAL FIBRILLATION (HCC): Primary | ICD-10-CM

## 2017-08-08 DIAGNOSIS — R00.2 PALPITATIONS: ICD-10-CM

## 2017-08-08 DIAGNOSIS — R03.0 PREHYPERTENSION: ICD-10-CM

## 2017-08-08 DIAGNOSIS — Z98.890 S/P ABLATION OF ATRIAL FIBRILLATION: ICD-10-CM

## 2017-08-08 DIAGNOSIS — I10 ESSENTIAL HYPERTENSION: ICD-10-CM

## 2017-08-08 DIAGNOSIS — Z86.79 S/P ABLATION OF ATRIAL FIBRILLATION: ICD-10-CM

## 2017-08-08 RX ORDER — WARFARIN SODIUM 5 MG/1
5 TABLET ORAL DAILY
COMMUNITY
End: 2019-10-17

## 2017-08-08 RX ORDER — HYDROCHLOROTHIAZIDE 25 MG/1
12.5 TABLET ORAL DAILY
Qty: 30 TAB | Refills: 1
Start: 2017-08-08 | End: 2017-09-18 | Stop reason: SDUPTHER

## 2017-08-08 RX ORDER — METOPROLOL TARTRATE 25 MG/1
25 TABLET, FILM COATED ORAL 2 TIMES DAILY
Qty: 60 TAB | Refills: 1 | Status: SHIPPED | OUTPATIENT
Start: 2017-08-08 | End: 2017-09-18 | Stop reason: SDUPTHER

## 2017-08-08 RX ORDER — METOPROLOL TARTRATE 25 MG/1
12.5 TABLET, FILM COATED ORAL 2 TIMES DAILY
COMMUNITY
End: 2017-08-08 | Stop reason: SDUPTHER

## 2017-08-08 NOTE — PROGRESS NOTES
HISTORY OF PRESENT ILLNESS  Joyce Sommer is a 47 y.o. female. Palpitations    The history is provided by the patient. This is a new problem. The current episode started more than 1 week ago (yrs). The problem has not changed (PAF) since onset. The problem occurs every several days (2/wk). On average, each episode lasts 1 hour. The problem is associated with nothing. Associated symptoms include diaphoresis (mild sweating- rarely), lower extremity edema, dizziness and shortness of breath. Pertinent negatives include no fever, no malaise/fatigue, no chest pain, no claudication, no orthopnea, no PND, no nausea, no vomiting, no headaches and no cough. Chest Pain (Angina)    The history is provided by the patient. This is a new problem. The current episode started more than 1 week ago (1/16). The problem has not changed since onset. Duration of episode(s) is 15 minutes. The problem occurs every several days. The pain is associated with rest, normal activity and movement. The pain is present in the substernal region. The quality of the pain is described as vice-like and tightness. The pain radiates to the left jaw, right jaw, mid back and left arm. Associated symptoms include diaphoresis (mild sweating- rarely), dizziness, lower extremity edema, palpitations and shortness of breath. Pertinent negatives include no claudication, no cough, no fever, no headaches, no malaise/fatigue, no nausea, no orthopnea, no PND and no vomiting. She has tried nothing for the symptoms. Leg Swelling   The history is provided by the patient. This is a recurrent problem. The current episode started more than 1 week ago. The problem occurs every several days. Associated symptoms include shortness of breath. Pertinent negatives include no chest pain and no headaches. The symptoms are aggravated by standing. The symptoms are relieved by sleep. Shortness of Breath   The history is provided by the patient. This is a new problem.  The problem occurs intermittently. The current episode started more than 1 week ago. Pertinent negatives include no fever, no headaches, no cough, no wheezing, no PND, no orthopnea, no chest pain, no vomiting, no rash, no leg swelling and no claudication. The problem's precipitants include exercise (sometimes while talking). Review of Systems   Constitutional: Positive for diaphoresis (mild sweating- rarely). Negative for chills, fever, malaise/fatigue and weight loss. HENT: Negative for nosebleeds. Eyes: Negative for discharge. Respiratory: Positive for shortness of breath. Negative for cough and wheezing. Cardiovascular: Positive for palpitations. Negative for chest pain, orthopnea, claudication, leg swelling and PND. Gastrointestinal: Negative for diarrhea, nausea and vomiting. Genitourinary: Negative for dysuria and hematuria. Musculoskeletal: Negative for joint pain. Skin: Negative for rash. Neurological: Positive for dizziness. Negative for seizures, loss of consciousness and headaches. Endo/Heme/Allergies: Negative for polydipsia. Does not bruise/bleed easily. Psychiatric/Behavioral: Negative for depression and substance abuse. The patient does not have insomnia. No Known Allergies    Past Medical History:   Diagnosis Date    Atrial fibrillation ([facility] [address] 1992    Palpitations 1/27/2017 1/17 rare but tody lasted 1 hr d/w pt- will wait for more testing    Thyroid disease        Family History   Problem Relation Age of Onset    Heart Attack Father 48    Stroke Father     Heart Surgery Father        Social History   Substance Use Topics    Smoking status: Former Smoker     Quit date: 1/10/1982    Smokeless tobacco: Never Used    Alcohol use Yes      Comment: rare wine glass <1/month        Current Outpatient Prescriptions   Medication Sig    warfarin (COUMADIN) 5 mg tablet Take 5 mg by mouth daily.     metoprolol tartrate (LOPRESSOR) 25 mg tablet Take 12.5 mg by mouth two (2) times a day.    spironolactone (ALDACTONE) 25 mg tablet Take 1 Tab by mouth daily.  hydroCHLOROthiazide (HYDRODIURIL) 25 mg tablet Take 1 Tab by mouth daily.  carvedilol (COREG) 6.25 mg tablet Take 1 Tab by mouth two (2) times daily (with meals).  traMADol (ULTRAM) 50 mg tablet Take 1 Tab by mouth three (3) times daily. Max Daily Amount: 150 mg. Indications: Pain (Patient taking differently: Take 50 mg by mouth every six (6) hours as needed. Indications: Pain)    cholecalciferol (VITAMIN D3) 1,000 unit cap Take  by mouth daily.  nitroglycerin (NITROSTAT) 0.4 mg SL tablet 1 Tab by SubLINGual route every five (5) minutes as needed for Chest Pain for up to 3 doses. No current facility-administered medications for this visit. Past Surgical History:   Procedure Laterality Date    HX PARTIAL THYROIDECTOMY  2014    nodule with trach shift       Diagnostic Studies:  I have reviewed the relevant tests done on the patient and show as follows  EKG tracings reviewed by me today. No flowsheet data found. 6/17 CTA Heart/Lungs/PV  The coronary arteries have normal origins and courses. There are no   distinct coronary calcifications identified, though this study was not   optimized for coronary artery evaluation. The limited images of the upper abdomen are unremarkable. IMPRESSION:   1. Normal pulmonary venous anatomy without pulmonary vein stenosis. 2. Mild biatrial enlargement. No left atrial or left atrial appendage   thrombus. 5/17 ECHO  ECHO CARDIOGRAM [address]  Component Name Value Ref Range   EF Echo 65     Result Impression   :   DECREASED LEFT VENTRICULAR CAVITY SIZE WITH LEFT VENTRICULAR HYPERTROPHY PRESENT. NORMAL GLOBAL LEFT VENTRICULAR SYSTOLIC FUNCTION WITH AN ESTIMATED EJECTION FRACTION OF   65%. MILD DIASTOLIC DYSFUNCTION. MITRAL ANNULAR CALCIFICATION. SCLEROTIC TRILEAFLET AORTIC VALVE WITHOUT EVIDENCE OF STENOSIS.    TRACE OF TRICUSPID REGURGITATION WITH A NORMAL PULMONARY ARTERY PRESSURE   STRUCTURALLY NORMAL PULMONIC VALVE. NO EVIDENCE OF PERICARDIAL EFFUSION. NO MASSES, SHUNTS OR THROMBI SEEN. NO PREVIOUS REPORT FOR COMPARISON. 6/17 PVL  Conclusions: Hemodynamics in the right common femoral vein are consistent with more proximal venous obstruction. No evidence of deep venous thrombosis in the right  femoral, deep femoral, popliteal, posterior tibial and peroneal veins. Technically compromised study therefore non-occlusive deep venous thrombosis cannot be excluded in the right common femoral vein as described in the findings.    Reflux evaluation is deferred. 1/17 Nuc Stress  Conclusion:   1. Normal perfusion scan. 2. Normal wall motion and ejection fraction. 3. Low risk scan. Visit Vitals    /81    Ht 5' 1\" (1.549 m)    Wt 74.4 kg (164 lb)    BMI 30.99 kg/m2       Ms. Jorge Vang has a reminder for a \"due or due soon\" health maintenance. I have asked that she contact her primary care provider for follow-up on this health maintenance. Physical Exam   Constitutional: She is oriented to person, place, and time. She appears well-developed and well-nourished. No distress. HENT:   Head: Normocephalic and atraumatic. Mouth/Throat: Normal dentition. Eyes: Right eye exhibits no discharge. Left eye exhibits no discharge. No scleral icterus. Neck: Neck supple. No JVD present. Carotid bruit is not present. No thyromegaly present. Cardiovascular: Normal rate, regular rhythm, S1 normal, S2 normal, normal heart sounds and intact distal pulses. Exam reveals no gallop and no friction rub. No murmur heard. Pulmonary/Chest: Effort normal and breath sounds normal. She has no wheezes. She has no rales. She exhibits tenderness (lower sternal and right lower parasternal). Abdominal: Soft. She exhibits no mass. There is no tenderness. Musculoskeletal: She exhibits no edema.    Lymphadenopathy:        Right cervical: No superficial cervical adenopathy present. Left cervical: No superficial cervical adenopathy present. Neurological: She is alert and oriented to person, place, and time. Skin: Skin is warm and dry. No rash noted. Psychiatric: She has a normal mood and affect. Her behavior is normal.       ASSESSMENT and PLAN          Diagnoses and all orders for this visit:    1. Paroxysmal atrial fibrillation ([address]  Comments:  8/17 STach;   new in 5/17    2. S/P ablation of atrial fibrillation  Comments:  6/17 Dr Peggy Berry    3. Obesity (BMI 30.0-34. 9)  Comments:  Weight loss has been strongly encouraged by following dietary restrictions and an exercise routine. 4. Prehypertension  -     hydroCHLOROthiazide (HYDRODIURIL) 25 mg tablet; Take 0.5 Tabs by mouth daily. 5. Palpitations  Comments:  8/17 STach in 100s  reduce HCTZ and cody metoprolol; get event monitor  Orders:  -     metoprolol tartrate (LOPRESSOR) 25 mg tablet; Take 1 Tab by mouth two (2) times a day. Indications: hypertension  -     hydroCHLOROthiazide (HYDRODIURIL) 25 mg tablet; Take 0.5 Tabs by mouth daily. -     ECG,PT DEMAND EVENT,PRESYMPT MEMORY LOOP; Future    6. Essential hypertension  Comments:  controlled        Pertinent laboratory and test data reviewed and discussed with patient.   See patient instructions also for other medical advice given    Medications Discontinued During This Encounter   Medication Reason    ASPIRIN (ASPIR-LOW PO) Therapy Completed    carvedilol (COREG) 6.25 mg tablet Alternate Therapy    metoprolol tartrate (LOPRESSOR) 25 mg tablet Reorder    hydroCHLOROthiazide (HYDRODIURIL) 25 mg tablet Reorder       Follow-up Disposition:  Return in about 6 weeks (around 9/19/2017), or if symptoms worsen or fail to improve, for post test.

## 2017-08-08 NOTE — PROGRESS NOTES
1. Have you been to the ER, urgent care clinic since your last visit? Hospitalized since your last visit? Yes Where: [facility]    2. Have you seen or consulted any other health care providers outside of the [address] [place] [address] since your last visit? Include any pap smears or colon screening. Yes Where: [facility]     3. Since your last visit, have you had any of the following symptoms? chest pains, palpitations, shortness of breath, dizziness and swelling in legs/arms. 4.  Have you had any blood work, X-rays or cardiac testing? No            5.  Where do you normally have your labs drawn? [facility]    6. Do you need any refills today?    No

## 2017-08-08 NOTE — MR AVS SNAPSHOT
Visit Information Date & Time Provider Department Dept. Phone Encounter #  
 8/8/2017 11:30 AM Eric Wilson MD [facility] [phone] Follow-up Instructions Return in about 6 weeks (around 9/19/2017), or if symptoms worsen or fail to improve, for post test.  
  
Your Appointments 9/18/2017  8:45 AM  
Follow Up with Eric Wilson MD  
[facility] ([facility]) Appt Note: 6 week post event follow up  
 [address] [facility] [address]  
  
   
 [address] [id number] [place] [address] Upcoming Health Maintenance Date Due DTaP/Tdap/Td series (1 - Tdap) 3/7/1987 PAP AKA CERVICAL CYTOLOGY 3/7/1987 BREAST CANCER SCRN MAMMOGRAM 3/7/2016 FOBT Q 1 YEAR AGE 50-75 3/7/2016 INFLUENZA AGE 9 TO ADULT 8/1/2017 Allergies as of 8/8/2017  Review Complete On: 8/8/2017 By: Eric Wilson MD  
 No Known Allergies Current Immunizations  Never Reviewed No immunizations on file. Not reviewed this visit You Were Diagnosed With   
  
 Codes Comments Paroxysmal atrial fibrillation (HCC)    -  Primary ICD-10-CM: I48.0 ICD-9-CM: 427.31 8/17 STach;  
new in 5/17 S/P ablation of atrial fibrillation     ICD-10-CM: Z98.890, Z86.79 
ICD-9-CM: V45.89 6/17 Dr Di Kim Obesity (BMI 30.0-34.9)     ICD-10-CM: K26.8 ICD-9-CM: 278.00 Weight loss has been strongly encouraged by following dietary restrictions and an exercise routine. Prehypertension     ICD-10-CM: R03.0 ICD-9-CM: 796.2 Palpitations     ICD-10-CM: R00.2 ICD-9-CM: 785.1 8/17 STach in 100s 
reduce HCTZ and cody metoprolol; get event monitor Essential hypertension     ICD-10-CM: I10 
ICD-9-CM: 401.9 controlled Vitals BP Height(growth percentile) Weight(growth percentile) BMI Smoking Status 133/81 5' 1\" (1.549 m) 164 lb (74.4 kg) 30.99 kg/m2 Former Smoker Vitals History BMI and BSA Data Body Mass Index Body Surface Area 30.99 kg/m 2 1.79 m 2 Preferred Pharmacy Pharmacy Name Phone [address] [facility] [phone] Your Updated Medication List  
  
   
This list is accurate as of: 8/8/17  1:05 PM.  Always use your most recent med list.  
  
  
  
  
 hydroCHLOROthiazide 25 mg tablet Commonly known as:  HYDRODIURIL Take 0.5 Tabs by mouth daily. metoprolol tartrate 25 mg tablet Commonly known as:  LOPRESSOR Take 1 Tab by mouth two (2) times a day. Indications: hypertension  
  
 nitroglycerin 0.4 mg SL tablet Commonly known as:  NITROSTAT  
1 Tab by SubLINGual route every five (5) minutes as needed for Chest Pain for up to 3 doses. spironolactone 25 mg tablet Commonly known as:  ALDACTONE Take 1 Tab by mouth daily. traMADol 50 mg tablet Commonly known as:  ULTRAM  
Take 1 Tab by mouth three (3) times daily. Max Daily Amount: 150 mg. Indications: Pain VITAMIN D3 1,000 unit Cap Generic drug:  cholecalciferol Take  by mouth daily. warfarin 5 mg tablet Commonly known as:  COUMADIN Take 5 mg by mouth daily. Prescriptions Sent to Pharmacy Refills  
 metoprolol tartrate (LOPRESSOR) 25 mg tablet 1 Sig: Take 1 Tab by mouth two (2) times a day. Indications: hypertension Class: Normal  
 Pharmacy: [address], [address] Ph #: [phone] Route: Oral  
  
Follow-up Instructions Return in about 6 weeks (around 9/19/2017), or if symptoms worsen or fail to improve, for post test.  
  
To-Do List   
 Around 08/11/2017 Procedures:  ECG,PT DEMAND EVENT,PRESYMPT MEMORY LOOP Patient Instructions Medications Discontinued During This Encounter Medication Reason  ASPIRIN (ASPIR-LOW PO) Therapy Completed  carvedilol (COREG) 6.25 mg tablet Alternate Therapy  metoprolol tartrate (LOPRESSOR) 25 mg tablet Reorder  hydroCHLOROthiazide (HYDRODIURIL) 25 mg tablet Reorder Orders Placed This Encounter  ECG,PT DEMAND EVENT,PRESYMPT MEMORY LOOP Standing Status:   Future Standing Expiration Date:   2/4/2018  metoprolol tartrate (LOPRESSOR) 25 mg tablet Sig: Take 1 Tab by mouth two (2) times a day. Indications: hypertension Dispense:  60 Tab Refill:  1  
 hydroCHLOROthiazide (HYDRODIURIL) 25 mg tablet Sig: Take 0.5 Tabs by mouth daily. Dispense:  30 Tab Refill:  1 Body Mass Index: Care Instructions Your Care Instructions Body mass index (BMI) can help you see if your weight is raising your risk for health problems. It uses a formula to compare how much you weigh with how tall you are. · A BMI lower than 18.5 is considered underweight. · A BMI between 18.5 and 24.9 is considered healthy. · A BMI between 25 and 29.9 is considered overweight. A BMI of 30 or higher is considered obese. If your BMI is in the normal range, it means that you have a lower risk for weight-related health problems. If your BMI is in the overweight or obese range, you may be at increased risk for weight-related health problems, such as high blood pressure, heart disease, stroke, arthritis or joint pain, and diabetes. If your BMI is in the underweight range, you may be at increased risk for health problems such as fatigue, lower protection (immunity) against illness, muscle loss, bone loss, hair loss, and hormone problems. BMI is just one measure of your risk for weight-related health problems. You may be at higher risk for health problems if you are not active, you eat an unhealthy diet, or you drink too much alcohol or use tobacco products. Follow-up care is a key part of your treatment and safety.  Be sure to make and go to all appointments, and call your doctor if you are having problems. It's also a good idea to know your test results and keep a list of the medicines you take. How can you care for yourself at home? · Practice healthy eating habits. This includes eating plenty of fruits, vegetables, whole grains, lean protein, and low-fat dairy. · If your doctor recommends it, get more exercise. Walking is a good choice. Bit by bit, increase the amount you walk every day. Try for at least 30 minutes on most days of the week. · Do not smoke. Smoking can increase your risk for health problems. If you need help quitting, talk to your doctor about stop-smoking programs and medicines. These can increase your chances of quitting for good. · Limit alcohol to 2 drinks a day for men and 1 drink a day for women. Too much alcohol can cause health problems. If you have a BMI higher than 25 · Your doctor may do other tests to check your risk for weight-related health problems. This may include measuring the distance around your waist. A waist measurement of more than 40 inches in men or 35 inches in women can increase the risk of weight-related health problems. · Talk with your doctor about steps you can take to stay healthy or improve your health. You may need to make lifestyle changes to lose weight and stay healthy, such as changing your diet and getting regular exercise. If you have a BMI lower than 18.5 · Your doctor may do other tests to check your risk for health problems. · Talk with your doctor about steps you can take to stay healthy or improve your health. You may need to make lifestyle changes to gain or maintain weight and stay healthy, such as getting more healthy foods in your diet and doing exercises to build muscle. Where can you learn more? Go to http://deedee-jacklyn.info/. Enter S176 in the search box to learn more about \"Body Mass Index: Care Instructions. \" Current as of: January 23, 2017 Content Version: 11.3 © 9039-6472 [employer]. Care instructions adapted under license by [employer] (which disclaims liability or warranty for this information). If you have questions about a medical condition or this instruction, always ask your healthcare professional. [address] any warranty or liability for your use of this information. Introducing [facility] HE[facility]! Dear Magaly Ceron: Thank you for requesting a [employer] account. Our records indicate that you already have an active [employer] account. You can access your account anytime at [email or web address] Did you know that you can access your hospital and ER discharge instructions at any time in [employer]? You can also review all of your test results from your hospital stay or ER visit. Additional Information If you have questions, please visit the Frequently Asked Questions section of the [employer] website at [email or web address] Remember, [employer] is NOT to be used for urgent needs. For medical emergencies, dial 911. Now available from your iPhone and Android! Please provide this summary of care documentation to your next provider. Your primary care clinician is listed as LAKISHA HERNÁNDEZ. If you have any questions after today's visit, please call [phone].

## 2017-08-08 NOTE — PATIENT INSTRUCTIONS
Medications Discontinued During This Encounter   Medication Reason    ASPIRIN (ASPIR-LOW PO) Therapy Completed    carvedilol (COREG) 6.25 mg tablet Alternate Therapy    metoprolol tartrate (LOPRESSOR) 25 mg tablet Reorder    hydroCHLOROthiazide (HYDRODIURIL) 25 mg tablet Reorder       Orders Placed This Encounter    ECG,PT DEMAND EVENT,PRESYMPT MEMORY LOOP     Standing Status:   Future     Standing Expiration Date:   2/4/2018    metoprolol tartrate (LOPRESSOR) 25 mg tablet     Sig: Take 1 Tab by mouth two (2) times a day. Indications: hypertension     Dispense:  60 Tab     Refill:  1    hydroCHLOROthiazide (HYDRODIURIL) 25 mg tablet     Sig: Take 0.5 Tabs by mouth daily. Dispense:  30 Tab     Refill:  1          Body Mass Index: Care Instructions  Your Care Instructions    Body mass index (BMI) can help you see if your weight is raising your risk for health problems. It uses a formula to compare how much you weigh with how tall you are. · A BMI lower than 18.5 is considered underweight. · A BMI between 18.5 and 24.9 is considered healthy. · A BMI between 25 and 29.9 is considered overweight. A BMI of 30 or higher is considered obese. If your BMI is in the normal range, it means that you have a lower risk for weight-related health problems. If your BMI is in the overweight or obese range, you may be at increased risk for weight-related health problems, such as high blood pressure, heart disease, stroke, arthritis or joint pain, and diabetes. If your BMI is in the underweight range, you may be at increased risk for health problems such as fatigue, lower protection (immunity) against illness, muscle loss, bone loss, hair loss, and hormone problems. BMI is just one measure of your risk for weight-related health problems. You may be at higher risk for health problems if you are not active, you eat an unhealthy diet, or you drink too much alcohol or use tobacco products.   Follow-up care is a key part of your treatment and safety. Be sure to make and go to all appointments, and call your doctor if you are having problems. It's also a good idea to know your test results and keep a list of the medicines you take. How can you care for yourself at home? · Practice healthy eating habits. This includes eating plenty of fruits, vegetables, whole grains, lean protein, and low-fat dairy. · If your doctor recommends it, get more exercise. Walking is a good choice. Bit by bit, increase the amount you walk every day. Try for at least 30 minutes on most days of the week. · Do not smoke. Smoking can increase your risk for health problems. If you need help quitting, talk to your doctor about stop-smoking programs and medicines. These can increase your chances of quitting for good. · Limit alcohol to 2 drinks a day for men and 1 drink a day for women. Too much alcohol can cause health problems. If you have a BMI higher than 25  · Your doctor may do other tests to check your risk for weight-related health problems. This may include measuring the distance around your waist. A waist measurement of more than 40 inches in men or 35 inches in women can increase the risk of weight-related health problems. · Talk with your doctor about steps you can take to stay healthy or improve your health. You may need to make lifestyle changes to lose weight and stay healthy, such as changing your diet and getting regular exercise. If you have a BMI lower than 18.5  · Your doctor may do other tests to check your risk for health problems. · Talk with your doctor about steps you can take to stay healthy or improve your health. You may need to make lifestyle changes to gain or maintain weight and stay healthy, such as getting more healthy foods in your diet and doing exercises to build muscle. Where can you learn more? Go to http://deedee-jacklyn.info/.   Enter S176 in the search box to learn more about \"Body Mass Index: Care Instructions. \"  Current as of: January 23, 2017  Content Version: 11.3  © 5289-2778 [employer], [facility]. Care instructions adapted under license by [employer] (which disclaims liability or warranty for this information). If you have questions about a medical condition or this instruction, always ask your healthcare professional. [place] [address] any warranty or liability for your use of this information.

## 2017-08-17 ENCOUNTER — TELEPHONE (OUTPATIENT)
Dept: CARDIOLOGY CLINIC | Age: 52
End: 2017-08-17

## 2017-08-17 NOTE — TELEPHONE ENCOUNTER
Patient came by the office last week and was having issues with the first set of electrodes irritating her skin so we tried another type and patient came by the office today stating that the other electrodes irritated and burned her skin. Patient has turned in her monitor due to not being able to wear it because of the electrodes. Please Advise.

## 2017-08-17 NOTE — TELEPHONE ENCOUNTER
Okay.  We cannot then do the monitoring. Please tell her to go to ER or come to office during palpitations for an EKG.

## 2017-09-18 ENCOUNTER — OFFICE VISIT (OUTPATIENT)
Dept: CARDIOLOGY CLINIC | Age: 52
End: 2017-09-18

## 2017-09-18 VITALS
WEIGHT: 164 LBS | SYSTOLIC BLOOD PRESSURE: 137 MMHG | BODY MASS INDEX: 30.96 KG/M2 | HEIGHT: 61 IN | DIASTOLIC BLOOD PRESSURE: 83 MMHG | HEART RATE: 106 BPM

## 2017-09-18 DIAGNOSIS — E66.9 OBESITY (BMI 30.0-34.9): ICD-10-CM

## 2017-09-18 DIAGNOSIS — I10 ESSENTIAL HYPERTENSION: ICD-10-CM

## 2017-09-18 DIAGNOSIS — R00.2 PALPITATIONS: ICD-10-CM

## 2017-09-18 DIAGNOSIS — Z98.890 S/P ABLATION OF ATRIAL FIBRILLATION: ICD-10-CM

## 2017-09-18 DIAGNOSIS — Z86.79 S/P ABLATION OF ATRIAL FIBRILLATION: ICD-10-CM

## 2017-09-18 DIAGNOSIS — R00.2 PALPITATIONS: Primary | ICD-10-CM

## 2017-09-18 DIAGNOSIS — I48.0 PAROXYSMAL ATRIAL FIBRILLATION (HCC): ICD-10-CM

## 2017-09-18 RX ORDER — HYDROCHLOROTHIAZIDE 25 MG/1
12.5 TABLET ORAL EVERY OTHER DAY
Qty: 30 TAB | Refills: 1 | Status: SHIPPED | OUTPATIENT
Start: 2017-09-18 | End: 2019-04-19 | Stop reason: SDUPTHER

## 2017-09-18 RX ORDER — METOPROLOL TARTRATE 50 MG/1
50 TABLET ORAL 2 TIMES DAILY
Qty: 180 TAB | Refills: 1 | Status: SHIPPED | OUTPATIENT
Start: 2017-09-18 | End: 2019-04-19

## 2017-09-18 NOTE — MR AVS SNAPSHOT
Visit Information Date & Time Provider Department Dept. Phone Encounter #  
 9/18/2017  8:45 AM Ronen Rowe MD [facility] [phone] Follow-up Instructions Return in about 6 months (around 3/18/2018), or if symptoms worsen or fail to improve, for post test.  
  
Your Appointments 3/5/2018  8:45 AM  
ESTABLISHED PATIENT with Ronen Rowe MD  
[facility] ([address]) Appt Note: 6 month follow up/BMP  
 [address] [facility] [address]  
  
   
 [address] [place] [address] Upcoming Health Maintenance Date Due DTaP/Tdap/Td series (1 - Tdap) 3/7/1987 PAP AKA CERVICAL CYTOLOGY 3/7/1987 BREAST CANCER SCRN MAMMOGRAM 3/7/2016 FOBT Q 1 YEAR AGE 50-75 3/7/2016 INFLUENZA AGE 9 TO ADULT 8/1/2017 Allergies as of 9/18/2017  Review Complete On: 9/18/2017 By: Ronen Rowe MD  
 No Known Allergies Current Immunizations  Never Reviewed No immunizations on file. Not reviewed this visit You Were Diagnosed With   
  
 Codes Comments Palpitations    -  Primary ICD-10-CM: R00.2 ICD-9-CM: 785.1 9/17 STach, incr metoprolol; reduce HCTZ Essential hypertension     ICD-10-CM: I10 
ICD-9-CM: 401.9 controlled; reduce HCTZ./aldactone; incr bb  
 Paroxysmal atrial fibrillation (HCC)     ICD-10-CM: I48.0 ICD-9-CM: 427.31 new in 5/17 S/P ablation of atrial fibrillation     ICD-10-CM: Z98.890, Z86.79 
ICD-9-CM: V45.89 6/17 Dr Mac Olguin Obesity (BMI 30.0-34.9)     ICD-10-CM: G43.6 ICD-9-CM: 278.00 Weight loss has been strongly encouraged by following dietary restrictions and an exercise routine. Palpitations     ICD-10-CM: R00.2 ICD-9-CM: 785.1 8/17 STach in 100s 
reduce HCTZ and cody metoprolol; get event monitor Vitals BP Pulse Height(growth percentile) Weight(growth percentile) BMI Smoking Status 137/83 (!) 106 5' 1\" (1.549 m) 164 lb (74.4 kg) 30.99 kg/m2 Former Smoker Vitals History BMI and BSA Data Body Mass Index Body Surface Area 30.99 kg/m 2 1.79 m 2 Preferred Pharmacy Pharmacy Name Phone [facility] [phone] Your Updated Medication List  
  
   
This list is accurate as of: 9/18/17  9:30 AM.  Always use your most recent med list.  
  
  
  
  
 hydroCHLOROthiazide 25 mg tablet Commonly known as:  HYDRODIURIL Take 0.5 Tabs by mouth every other day. Indications: hypertension  
  
 metoprolol tartrate 50 mg tablet Commonly known as:  LOPRESSOR Take 1 Tab by mouth two (2) times a day. Indications: hypertension  
  
 nitroglycerin 0.4 mg SL tablet Commonly known as:  NITROSTAT  
1 Tab by SubLINGual route every five (5) minutes as needed for Chest Pain for up to 3 doses. spironolactone 25 mg tablet Commonly known as:  ALDACTONE Take 1 Tab by mouth daily. traMADol 50 mg tablet Commonly known as:  ULTRAM  
Take 1 Tab by mouth three (3) times daily. Max Daily Amount: 150 mg. Indications: Pain VITAMIN D3 1,000 unit Cap Generic drug:  cholecalciferol Take  by mouth daily. warfarin 5 mg tablet Commonly known as:  COUMADIN Take 5 mg by mouth daily. Prescriptions Sent to Pharmacy Refills  
 metoprolol tartrate (LOPRESSOR) 50 mg tablet 1 Sig: Take 1 Tab by mouth two (2) times a day. Indications: hypertension Class: Normal  
 Pharmacy: [address], [address] Ph #: [phone] Route: Oral  
 hydroCHLOROthiazide (HYDRODIURIL) 25 mg tablet 1 Sig: Take 0.5 Tabs by mouth every other day. Indications: hypertension Class: Normal  
 Pharmacy: [address], [address] Ph #: [phone] Route: Oral  
  
We Performed the Following AMB POC EKG ROUTINE W/ 12 LEADS, INTER & REP [58599 CPT(R)] Follow-up Instructions Return in about 6 months (around 3/18/2018), or if symptoms worsen or fail to improve, for post test.  
  
To-Do List   
 Around 09/28/2017 Lab:  METABOLIC PANEL, BASIC Patient Instructions After the recommended changes have been made in blood pressure medicines, patient advised to keep BP/HR(pulse rate) chart twice daily and bring us results in next 2 weeks or so. Patient may send the results via \"My Chart\" if desired. Please rest for 5-10 minutes before checking blood pressure Medications Discontinued During This Encounter Medication Reason  metoprolol tartrate (LOPRESSOR) 25 mg tablet Reorder  hydroCHLOROthiazide (HYDRODIURIL) 25 mg tablet Reorder Orders Placed This Encounter  METABOLIC PANEL, BASIC Standing Status:   Future Standing Expiration Date:   12/19/2017  AMB POC EKG ROUTINE W/ 12 LEADS, INTER & REP Order Specific Question:   Reason for Exam: Answer:   palpitations  metoprolol tartrate (LOPRESSOR) 50 mg tablet Sig: Take 1 Tab by mouth two (2) times a day. Indications: hypertension Dispense:  180 Tab Refill:  1  
 hydroCHLOROthiazide (HYDRODIURIL) 25 mg tablet Sig: Take 0.5 Tabs by mouth every other day. Indications: hypertension Dispense:  30 Tab Refill:  1 Body Mass Index: Care Instructions Your Care Instructions Body mass index (BMI) can help you see if your weight is raising your risk for health problems. It uses a formula to compare how much you weigh with how tall you are. · A BMI lower than 18.5 is considered underweight. · A BMI between 18.5 and 24.9 is considered healthy. · A BMI between 25 and 29.9 is considered overweight. A BMI of 30 or higher is considered obese.  
If your BMI is in the normal range, it means that you have a lower risk for weight-related health problems. If your BMI is in the overweight or obese range, you may be at increased risk for weight-related health problems, such as high blood pressure, heart disease, stroke, arthritis or joint pain, and diabetes. If your BMI is in the underweight range, you may be at increased risk for health problems such as fatigue, lower protection (immunity) against illness, muscle loss, bone loss, hair loss, and hormone problems. BMI is just one measure of your risk for weight-related health problems. You may be at higher risk for health problems if you are not active, you eat an unhealthy diet, or you drink too much alcohol or use tobacco products. Follow-up care is a key part of your treatment and safety. Be sure to make and go to all appointments, and call your doctor if you are having problems. It's also a good idea to know your test results and keep a list of the medicines you take. How can you care for yourself at home? · Practice healthy eating habits. This includes eating plenty of fruits, vegetables, whole grains, lean protein, and low-fat dairy. · If your doctor recommends it, get more exercise. Walking is a good choice. Bit by bit, increase the amount you walk every day. Try for at least 30 minutes on most days of the week. · Do not smoke. Smoking can increase your risk for health problems. If you need help quitting, talk to your doctor about stop-smoking programs and medicines. These can increase your chances of quitting for good. · Limit alcohol to 2 drinks a day for men and 1 drink a day for women. Too much alcohol can cause health problems. If you have a BMI higher than 25 · Your doctor may do other tests to check your risk for weight-related health problems. This may include measuring the distance around your waist. A waist measurement of more than 40 inches in men or 35 inches in women can increase the risk of weight-related health problems. · Talk with your doctor about steps you can take to stay healthy or improve your health. You may need to make lifestyle changes to lose weight and stay healthy, such as changing your diet and getting regular exercise. If you have a BMI lower than 18.5 · Your doctor may do other tests to check your risk for health problems. · Talk with your doctor about steps you can take to stay healthy or improve your health. You may need to make lifestyle changes to gain or maintain weight and stay healthy, such as getting more healthy foods in your diet and doing exercises to build muscle. Where can you learn more? Go to http://deedee-jacklyn.info/. Enter S176 in the search box to learn more about \"Body Mass Index: Care Instructions. \" Current as of: January 23, 2017 Content Version: 11.3 © 0811-9954 [employer]. Care instructions adapted under license by [employer] (which disclaims liability or warranty for this information). If you have questions about a medical condition or this instruction, always ask your healthcare professional. [place] [address] any warranty or liability for your use of this information. Introducing [facility]! Dear Krystian Kelly: Thank you for requesting a [employer] account. Our records indicate that you already have an active [employer] account. You can access your account anytime at [email or web address] Did you know that you can access your hospital and ER discharge instructions at any time in [employer]? You can also review all of your test results from your hospital stay or ER visit. Additional Information If you have questions, please visit the Frequently Asked Questions section of the [employer] website at [email or web address] Remember, [employer] is NOT to be used for urgent needs. For medical emergencies, dial 911. Now available from your iPhone and Android! Please provide this summary of care documentation to your next provider. Your primary care clinician is listed as Daphne Gallo. If you have any questions after today's visit, please call [phone].

## 2017-09-18 NOTE — PROGRESS NOTES
HISTORY OF PRESENT ILLNESS  Keturah Portillo is a 47 y.o. female. Palpitations    The history is provided by the patient. This is a new problem. The current episode started more than 1 week ago (yrs). The problem has not changed (PAF) since onset. The problem occurs every several days (2/wk). On average, each episode lasts 1 hour. The problem is associated with nothing. Associated symptoms include diaphoresis (mild sweating- rarely), lower extremity edema and shortness of breath. Pertinent negatives include no fever, no malaise/fatigue, no chest pain, no claudication, no orthopnea, no PND, no nausea, no vomiting, no headaches, no dizziness and no cough. Her past medical history is significant for hypertension. Hypertension   The history is provided by the medical records. This is a chronic problem. Associated symptoms include shortness of breath. Pertinent negatives include no chest pain and no headaches. Chest Pain (Angina)    The history is provided by the patient. This is a new problem. The current episode started more than 1 week ago (1/16). The problem has been rapidly improving. Duration of episode(s) is 15 minutes. The problem occurs every several days. The pain is associated with rest, normal activity and movement. The pain is present in the substernal region. The quality of the pain is described as vice-like and tightness. The pain radiates to the left jaw, right jaw, mid back and left arm. Associated symptoms include diaphoresis (mild sweating- rarely), lower extremity edema, palpitations and shortness of breath. Pertinent negatives include no claudication, no cough, no dizziness, no fever, no headaches, no malaise/fatigue, no nausea, no orthopnea, no PND and no vomiting. She has tried nothing for the symptoms. Leg Swelling   The history is provided by the patient. This is a recurrent problem. The current episode started more than 1 week ago. The problem occurs every several days.  The problem has been gradually improving. Associated symptoms include shortness of breath. Pertinent negatives include no chest pain and no headaches. The symptoms are aggravated by standing. The symptoms are relieved by sleep. Shortness of Breath   The history is provided by the patient. This is a new problem. The problem occurs intermittently. The current episode started more than 1 week ago. The problem has not changed since onset. Pertinent negatives include no fever, no headaches, no cough, no wheezing, no PND, no orthopnea, no chest pain, no vomiting, no rash, no leg swelling and no claudication. The problem's precipitants include exercise (sometimes while talking; 9/17 rushing across the highway). Review of Systems   Constitutional: Positive for diaphoresis (mild sweating- rarely). Negative for chills, fever, malaise/fatigue and weight loss. HENT: Negative for nosebleeds. Eyes: Negative for discharge. Respiratory: Positive for shortness of breath. Negative for cough and wheezing. Cardiovascular: Positive for palpitations. Negative for chest pain, orthopnea, claudication, leg swelling and PND. Gastrointestinal: Negative for diarrhea, nausea and vomiting. Genitourinary: Negative for dysuria and hematuria. Musculoskeletal: Negative for joint pain. Skin: Negative for rash. Neurological: Negative for dizziness, seizures, loss of consciousness and headaches. Endo/Heme/Allergies: Negative for polydipsia. Does not bruise/bleed easily. Psychiatric/Behavioral: Negative for depression and substance abuse. The patient does not have insomnia.       No Known Allergies    Past Medical History:   Diagnosis Date    Atrial fibrillation ([facility] [address] 1992    Essential hypertension 8/8/2017    Palpitations 1/27/2017 1/17 rare but tody lasted 1 hr d/w pt- will wait for more testing    Thyroid disease        Family History   Problem Relation Age of Onset    Heart Attack Father 48    Stroke Father     Heart Surgery Father        Social History   Substance Use Topics    Smoking status: Former Smoker     Quit date: 1/10/1982    Smokeless tobacco: Never Used    Alcohol use Yes      Comment: rare wine glass <1/month        Current Outpatient Prescriptions   Medication Sig    warfarin (COUMADIN) 5 mg tablet Take 5 mg by mouth daily.  metoprolol tartrate (LOPRESSOR) 25 mg tablet Take 1 Tab by mouth two (2) times a day. Indications: hypertension    hydroCHLOROthiazide (HYDRODIURIL) 25 mg tablet Take 0.5 Tabs by mouth daily.  spironolactone (ALDACTONE) 25 mg tablet Take 1 Tab by mouth daily.  traMADol (ULTRAM) 50 mg tablet Take 1 Tab by mouth three (3) times daily. Max Daily Amount: 150 mg. Indications: Pain (Patient taking differently: Take 50 mg by mouth every six (6) hours as needed. Indications: Pain)    cholecalciferol (VITAMIN D3) 1,000 unit cap Take  by mouth daily.  nitroglycerin (NITROSTAT) 0.4 mg SL tablet 1 Tab by SubLINGual route every five (5) minutes as needed for Chest Pain for up to 3 doses. No current facility-administered medications for this visit. Past Surgical History:   Procedure Laterality Date    HX PARTIAL THYROIDECTOMY  2014    nodule with trach shift       Diagnostic Studies:  I have reviewed the relevant tests done on the patient and show as follows  EKG tracings reviewed by me today. CARDIOLOGY STUDIES 8/8/2017   Event Monitor Result 2 wks- STach with activation and baseline     6/17 CTA Heart/Lungs/PV  The coronary arteries have normal origins and courses. There are no   distinct coronary calcifications identified, though this study was not   optimized for coronary artery evaluation. The limited images of the upper abdomen are unremarkable. IMPRESSION:   1. Normal pulmonary venous anatomy without pulmonary vein stenosis. 2. Mild biatrial enlargement. No left atrial or left atrial appendage   thrombus.    5/17 ECHO  ECHO CARDIOGRAM COMPLETE5/1/2017  [facility]  Component Name Value Ref Range   EF Echo 65     Result Impression   :   DECREASED LEFT VENTRICULAR CAVITY SIZE WITH LEFT VENTRICULAR HYPERTROPHY PRESENT. NORMAL GLOBAL LEFT VENTRICULAR SYSTOLIC FUNCTION WITH AN ESTIMATED EJECTION FRACTION OF   65%. MILD DIASTOLIC DYSFUNCTION. MITRAL ANNULAR CALCIFICATION. SCLEROTIC TRILEAFLET AORTIC VALVE WITHOUT EVIDENCE OF STENOSIS. TRACE OF TRICUSPID REGURGITATION WITH A NORMAL PULMONARY ARTERY PRESSURE   STRUCTURALLY NORMAL PULMONIC VALVE. NO EVIDENCE OF PERICARDIAL EFFUSION. NO MASSES, SHUNTS OR THROMBI SEEN. NO PREVIOUS REPORT FOR COMPARISON. 6/17 PVL  Conclusions: Hemodynamics in the right common femoral vein are consistent with more proximal venous obstruction. No evidence of deep venous thrombosis in the right  femoral, deep femoral, popliteal, posterior tibial and peroneal veins. Technically compromised study therefore non-occlusive deep venous thrombosis cannot be excluded in the right common femoral vein as described in the findings.    Reflux evaluation is deferred. 1/17 Nuc Stress  Conclusion:   1. Normal perfusion scan. 2. Normal wall motion and ejection fraction. 3. Low risk scan. Visit Vitals    /83    Pulse (!) 106    Ht 5' 1\" (1.549 m)    Wt 74.4 kg (164 lb)    BMI 30.99 kg/m2       Ms. Kathy Chawla has a reminder for a \"due or due soon\" health maintenance. I have asked that she contact her primary care provider for follow-up on this health maintenance. Physical Exam   Constitutional: She is oriented to person, place, and time. She appears well-developed and well-nourished. No distress. HENT:   Head: Normocephalic and atraumatic. Mouth/Throat: Normal dentition. Eyes: Right eye exhibits no discharge. Left eye exhibits no discharge. No scleral icterus. Neck: Neck supple. No JVD present. Carotid bruit is not present. No thyromegaly present.    Cardiovascular: Normal rate, regular rhythm, S1 normal, S2 normal, normal heart sounds and intact distal pulses. Exam reveals no gallop and no friction rub. No murmur heard. Pulmonary/Chest: Effort normal and breath sounds normal. She has no wheezes. She has no rales. She exhibits no tenderness. Abdominal: Soft. She exhibits no mass. There is no tenderness. Musculoskeletal: She exhibits no edema. Lymphadenopathy:        Right cervical: No superficial cervical adenopathy present. Left cervical: No superficial cervical adenopathy present. Neurological: She is alert and oriented to person, place, and time. Skin: Skin is warm and dry. No rash noted. Psychiatric: She has a normal mood and affect. Her behavior is normal.       ASSESSMENT and PLAN          Diagnoses and all orders for this visit:    1. Palpitations  Comments:  9/17 STach, incr metoprolol; reduce HCTZ  Orders:  -     AMB POC EKG ROUTINE W/ 12 LEADS, INTER & REP  -     metoprolol tartrate (LOPRESSOR) 50 mg tablet; Take 1 Tab by mouth two (2) times a day. Indications: hypertension  -     hydroCHLOROthiazide (HYDRODIURIL) 25 mg tablet; Take 0.5 Tabs by mouth every other day. Indications: hypertension    2. Essential hypertension  Comments:  controlled; reduce HCTZ./aldactone; incr bb  Orders:  -     hydroCHLOROthiazide (HYDRODIURIL) 25 mg tablet; Take 0.5 Tabs by mouth every other day. Indications: hypertension  -     METABOLIC PANEL, BASIC; Future    3. Paroxysmal atrial fibrillation (HCC)  Comments:  new in 5/17    4. S/P ablation of atrial fibrillation  Comments:  6/17 Dr Peggy Berry    5. Obesity (BMI 30.0-34. 9)  Comments:  Weight loss has been strongly encouraged by following dietary restrictions and an exercise routine. 6. Palpitations  Comments:  8/17 STach in 100s  reduce HCTZ and cody metoprolol; get event monitor  Orders:  -     AMB POC EKG ROUTINE W/ 12 LEADS, INTER & REP  -     metoprolol tartrate (LOPRESSOR) 50 mg tablet; Take 1 Tab by mouth two (2) times a day.  Indications: hypertension  -     hydroCHLOROthiazide (HYDRODIURIL) 25 mg tablet; Take 0.5 Tabs by mouth every other day. Indications: hypertension        Pertinent laboratory and test data reviewed and discussed with patient.   See patient instructions also for other medical advice given    Medications Discontinued During This Encounter   Medication Reason    metoprolol tartrate (LOPRESSOR) 25 mg tablet Reorder    hydroCHLOROthiazide (HYDRODIURIL) 25 mg tablet Reorder       Follow-up Disposition:  Return in about 6 months (around 3/18/2018), or if symptoms worsen or fail to improve, for post test.

## 2017-09-18 NOTE — PROGRESS NOTES
1. Have you been to the ER, urgent care clinic since your last visit? Hospitalized since your last visit? No    2. Have you seen or consulted any other health care providers outside of the [address] [place] [address] since your last visit? Include any pap smears or colon screening. No     3. Since your last visit, have you had any of the following symptoms? .           4. Have you had any blood work, X-rays or cardiac testing? No            5.  Where do you normally have your labs drawn? [facility]    6. Do you need any refills today?    No

## 2017-09-18 NOTE — PATIENT INSTRUCTIONS
After the recommended changes have been made in blood pressure medicines, patient advised to keep BP/HR(pulse rate) chart twice daily and bring us results in next 2 weeks or so. Patient may send the results via \"My Chart\" if desired. Please rest for 5-10 minutes before checking blood pressure  Medications Discontinued During This Encounter   Medication Reason    metoprolol tartrate (LOPRESSOR) 25 mg tablet Reorder    hydroCHLOROthiazide (HYDRODIURIL) 25 mg tablet Reorder       Orders Placed This Encounter    METABOLIC PANEL, BASIC     Standing Status:   Future     Standing Expiration Date:   12/19/2017    AMB POC EKG ROUTINE W/ 12 LEADS, INTER & REP     Order Specific Question:   Reason for Exam:     Answer:   palpitations    metoprolol tartrate (LOPRESSOR) 50 mg tablet     Sig: Take 1 Tab by mouth two (2) times a day. Indications: hypertension     Dispense:  180 Tab     Refill:  1    hydroCHLOROthiazide (HYDRODIURIL) 25 mg tablet     Sig: Take 0.5 Tabs by mouth every other day. Indications: hypertension     Dispense:  30 Tab     Refill:  1          Body Mass Index: Care Instructions  Your Care Instructions    Body mass index (BMI) can help you see if your weight is raising your risk for health problems. It uses a formula to compare how much you weigh with how tall you are. · A BMI lower than 18.5 is considered underweight. · A BMI between 18.5 and 24.9 is considered healthy. · A BMI between 25 and 29.9 is considered overweight. A BMI of 30 or higher is considered obese. If your BMI is in the normal range, it means that you have a lower risk for weight-related health problems. If your BMI is in the overweight or obese range, you may be at increased risk for weight-related health problems, such as high blood pressure, heart disease, stroke, arthritis or joint pain, and diabetes.  If your BMI is in the underweight range, you may be at increased risk for health problems such as fatigue, lower protection (immunity) against illness, muscle loss, bone loss, hair loss, and hormone problems. BMI is just one measure of your risk for weight-related health problems. You may be at higher risk for health problems if you are not active, you eat an unhealthy diet, or you drink too much alcohol or use tobacco products. Follow-up care is a key part of your treatment and safety. Be sure to make and go to all appointments, and call your doctor if you are having problems. It's also a good idea to know your test results and keep a list of the medicines you take. How can you care for yourself at home? · Practice healthy eating habits. This includes eating plenty of fruits, vegetables, whole grains, lean protein, and low-fat dairy. · If your doctor recommends it, get more exercise. Walking is a good choice. Bit by bit, increase the amount you walk every day. Try for at least 30 minutes on most days of the week. · Do not smoke. Smoking can increase your risk for health problems. If you need help quitting, talk to your doctor about stop-smoking programs and medicines. These can increase your chances of quitting for good. · Limit alcohol to 2 drinks a day for men and 1 drink a day for women. Too much alcohol can cause health problems. If you have a BMI higher than 25  · Your doctor may do other tests to check your risk for weight-related health problems. This may include measuring the distance around your waist. A waist measurement of more than 40 inches in men or 35 inches in women can increase the risk of weight-related health problems. · Talk with your doctor about steps you can take to stay healthy or improve your health. You may need to make lifestyle changes to lose weight and stay healthy, such as changing your diet and getting regular exercise. If you have a BMI lower than 18.5  · Your doctor may do other tests to check your risk for health problems.   · Talk with your doctor about steps you can take to stay healthy or improve your health. You may need to make lifestyle changes to gain or maintain weight and stay healthy, such as getting more healthy foods in your diet and doing exercises to build muscle. Where can you learn more? Go to http://deedee-jacklyn.info/. Enter S176 in the search box to learn more about \"Body Mass Index: Care Instructions. \"  Current as of: January 23, 2017  Content Version: 11.3  © 8585-6066 [employer]. Care instructions adapted under license by [employer] (which disclaims liability or warranty for this information). If you have questions about a medical condition or this instruction, always ask your healthcare professional. [address] any warranty or liability for your use of this information.

## 2017-09-19 DIAGNOSIS — R00.2 PALPITATIONS: ICD-10-CM

## 2017-09-27 ENCOUNTER — TELEPHONE (OUTPATIENT)
Dept: CARDIOLOGY CLINIC | Age: 52
End: 2017-09-27

## 2017-09-28 DIAGNOSIS — I10 ESSENTIAL HYPERTENSION: ICD-10-CM

## 2017-10-03 LAB
BUN SERPL-MCNC: 8 MG/DL (ref 6–24)
BUN/CREAT SERPL: 15 (ref 9–23)
CALCIUM SERPL-MCNC: 9.4 MG/DL (ref 8.7–10.2)
CHLORIDE SERPL-SCNC: 99 MMOL/L (ref 96–106)
CO2 SERPL-SCNC: 26 MMOL/L (ref 18–29)
CREAT SERPL-MCNC: 0.52 MG/DL (ref 0.57–1)
GLUCOSE SERPL-MCNC: 123 MG/DL (ref 65–99)
POTASSIUM SERPL-SCNC: 4.7 MMOL/L (ref 3.5–5.2)
SODIUM SERPL-SCNC: 141 MMOL/L (ref 134–144)

## 2019-03-20 DIAGNOSIS — R03.0 PREHYPERTENSION: ICD-10-CM

## 2019-03-21 RX ORDER — SPIRONOLACTONE 25 MG/1
TABLET ORAL
Qty: 30 TAB | Refills: 0 | OUTPATIENT
Start: 2019-03-21

## 2019-03-21 NOTE — TELEPHONE ENCOUNTER
Spoke with patient and she is aware her appointment is scheduled for Friday, April 19, 2019 09:45 AM

## 2019-04-19 ENCOUNTER — OFFICE VISIT (OUTPATIENT)
Dept: CARDIOLOGY CLINIC | Age: 54
End: 2019-04-19

## 2019-04-19 VITALS
SYSTOLIC BLOOD PRESSURE: 145 MMHG | DIASTOLIC BLOOD PRESSURE: 91 MMHG | BODY MASS INDEX: 30.78 KG/M2 | HEART RATE: 91 BPM | HEIGHT: 61 IN | WEIGHT: 163 LBS

## 2019-04-19 DIAGNOSIS — I50.32 CHRONIC DIASTOLIC CONGESTIVE HEART FAILURE (HCC): ICD-10-CM

## 2019-04-19 DIAGNOSIS — R00.2 PALPITATIONS: ICD-10-CM

## 2019-04-19 DIAGNOSIS — I10 ESSENTIAL HYPERTENSION: ICD-10-CM

## 2019-04-19 DIAGNOSIS — I50.32 CHRONIC DIASTOLIC CONGESTIVE HEART FAILURE (HCC): Primary | ICD-10-CM

## 2019-04-19 DIAGNOSIS — Z86.79 S/P ABLATION OF ATRIAL FIBRILLATION: ICD-10-CM

## 2019-04-19 DIAGNOSIS — E66.9 OBESITY (BMI 30.0-34.9): ICD-10-CM

## 2019-04-19 DIAGNOSIS — Z98.890 S/P ABLATION OF ATRIAL FIBRILLATION: ICD-10-CM

## 2019-04-19 RX ORDER — SPIRONOLACTONE 25 MG/1
25 TABLET ORAL DAILY
Qty: 90 TAB | Refills: 1 | Status: SHIPPED | OUTPATIENT
Start: 2019-04-19

## 2019-04-19 RX ORDER — HYDROCHLOROTHIAZIDE 25 MG/1
25 TABLET ORAL EVERY OTHER DAY
Qty: 90 TAB | Refills: 1 | Status: SHIPPED | OUTPATIENT
Start: 2019-04-19

## 2019-04-19 RX ORDER — METOPROLOL TARTRATE 100 MG/1
100 TABLET ORAL 2 TIMES DAILY
Qty: 180 TAB | Refills: 3 | Status: SHIPPED | OUTPATIENT
Start: 2019-04-19 | End: 2021-08-06

## 2019-04-19 NOTE — PATIENT INSTRUCTIONS
Medications Discontinued During This Encounter Medication Reason  spironolactone (ALDACTONE) 25 mg tablet Reorder  hydroCHLOROthiazide (HYDRODIURIL) 25 mg tablet Reorder  metoprolol tartrate (LOPRESSOR) 50 mg tablet After the recommended changes have been made in blood pressure medicines, patient advised to keep BP/HR(pulse rate) chart twice daily and bring us results in next 2 weeks or so. Patient may send the results via \"My Chart\" if desired. Please rest for 5-10 minutes before checking blood pressure

## 2019-04-19 NOTE — PROGRESS NOTES
HISTORY OF PRESENT ILLNESS Kory Bravo is a 49 y.o. female. Palpitations The history is provided by the patient. This is a new problem. The current episode started more than 1 week ago (yrs). The problem has been rapidly improving (PAF). The problem occurs every several days (2/wk). On average, each episode lasts 1 hour. The problem is associated with nothing. Associated symptoms include lower extremity edema and shortness of breath. Pertinent negatives include no diaphoresis, no fever, no malaise/fatigue, no chest pain, no claudication, no orthopnea, no PND, no nausea, no vomiting, no headaches, no dizziness and no cough. Her past medical history is significant for hypertension. Hypertension The history is provided by the medical records. This is a chronic problem. Associated symptoms include shortness of breath. Pertinent negatives include no chest pain and no headaches. Chest Pain (Angina) The history is provided by the patient. This is a new problem. The current episode started more than 1 week ago (1/16). The problem has been resolved. Duration of episode(s) is 15 minutes. The problem occurs every several days. The pain is associated with rest, normal activity and movement. The pain is present in the substernal region. The quality of the pain is described as vice-like and tightness. The pain radiates to the left jaw, right jaw, mid back and left arm. Associated symptoms include lower extremity edema, palpitations and shortness of breath. Pertinent negatives include no claudication, no cough, no diaphoresis, no dizziness, no fever, no headaches, no malaise/fatigue, no nausea, no orthopnea, no PND and no vomiting. She has tried nothing for the symptoms. Leg Swelling The history is provided by the patient. This is a recurrent problem. The current episode started more than 1 week ago. The problem occurs every several days. The problem has been gradually improving.  Associated symptoms include shortness of breath. Pertinent negatives include no chest pain and no headaches. The symptoms are aggravated by standing. The symptoms are relieved by sleep. Shortness of Breath The history is provided by the patient. This is a new problem. The problem occurs intermittently. The current episode started more than 1 week ago. The problem has not changed since onset. Pertinent negatives include no fever, no headaches, no cough, no wheezing, no PND, no orthopnea, no chest pain, no vomiting, no rash, no leg swelling and no claudication. The problem's precipitants include exercise (sometimes while talking; 9/17 rushing across the highway). Medication Refill The history is provided by the patient. Associated symptoms include shortness of breath. Pertinent negatives include no chest pain and no headaches. Review of Systems Constitutional: Negative for chills, diaphoresis, fever, malaise/fatigue and weight loss. HENT: Negative for nosebleeds. Eyes: Negative for discharge. Respiratory: Positive for shortness of breath. Negative for cough and wheezing. Cardiovascular: Positive for palpitations. Negative for chest pain, orthopnea, claudication, leg swelling and PND. Gastrointestinal: Negative for diarrhea, nausea and vomiting. Genitourinary: Negative for dysuria and hematuria. Musculoskeletal: Negative for joint pain. Skin: Negative for rash. Neurological: Negative for dizziness, seizures, loss of consciousness and headaches. Endo/Heme/Allergies: Negative for polydipsia. Does not bruise/bleed easily. Psychiatric/Behavioral: Negative for depression and substance abuse. The patient does not have insomnia. No Known Allergies Past Medical History:  
Diagnosis Date  Atrial fibrillation ([facility] [address] 1992  Essential hypertension 8/8/2017  Palpitations 1/27/2017 1/17 rare but tody lasted 1 hr d/w pt- will wait for more testing  Thyroid disease Family History Problem Relation Age of Onset  Heart Attack Father 48  Stroke Father  Heart Surgery Father Social History Tobacco Use  Smoking status: Former Smoker Last attempt to quit: 1/10/1982 Years since quittin.2  Smokeless tobacco: Never Used Substance Use Topics  Alcohol use: Yes Comment: rare wine glass <1/month  Drug use: No  
  
 
Current Outpatient Medications Medication Sig  
 metoprolol tartrate (LOPRESSOR) 50 mg tablet Take 1 Tab by mouth two (2) times a day. Indications: hypertension  hydroCHLOROthiazide (HYDRODIURIL) 25 mg tablet Take 0.5 Tabs by mouth every other day. Indications: hypertension  warfarin (COUMADIN) 5 mg tablet Take 5 mg by mouth daily.  spironolactone (ALDACTONE) 25 mg tablet Take 1 Tab by mouth daily.  traMADol (ULTRAM) 50 mg tablet Take 1 Tab by mouth three (3) times daily. Max Daily Amount: 150 mg. Indications: Pain (Patient taking differently: Take 50 mg by mouth every six (6) hours as needed. Indications: Pain)  cholecalciferol (VITAMIN D3) 1,000 unit cap Take  by mouth daily.  nitroglycerin (NITROSTAT) 0.4 mg SL tablet 1 Tab by SubLINGual route every five (5) minutes as needed for Chest Pain for up to 3 doses. No current facility-administered medications for this visit. Past Surgical History:  
Procedure Laterality Date  HX PARTIAL THYROIDECTOMY  2014  
 nodule with trach shift Diagnostic Studies: 
I have reviewed the relevant tests done on the patient and show as follows EKG tracings reviewed by me today. CARDIOLOGY STUDIES 2017 Event Monitor Result 2 wks- STach with activation and baseline  CTA Heart/Lungs/PV The coronary arteries have normal origins and courses. There are no  
distinct coronary calcifications identified, though this study was not  
optimized for coronary artery evaluation. The limited images of the upper abdomen are unremarkable. IMPRESSION:  
1. Normal pulmonary venous anatomy without pulmonary vein stenosis. 2. Mild biatrial enlargement. No left atrial or left atrial appendage  
thrombus. 5/17 ECHO 
ECHO CARDIOGRAM COMPLETE5/1/2017 [employer] Component Name Value Ref Range EF Echo 65    
Result Impression  
: 
 DECREASED LEFT VENTRICULAR CAVITY SIZE WITH LEFT VENTRICULAR HYPERTROPHY PRESENT. NORMAL GLOBAL LEFT VENTRICULAR SYSTOLIC FUNCTION WITH AN ESTIMATED EJECTION FRACTION OF 
 65%. MILD DIASTOLIC DYSFUNCTION. MITRAL ANNULAR CALCIFICATION. SCLEROTIC TRILEAFLET AORTIC VALVE WITHOUT EVIDENCE OF STENOSIS. TRACE OF TRICUSPID REGURGITATION WITH A NORMAL PULMONARY ARTERY PRESSURE 
 STRUCTURALLY NORMAL PULMONIC VALVE. NO EVIDENCE OF PERICARDIAL EFFUSION. NO MASSES, SHUNTS OR THROMBI SEEN. NO PREVIOUS REPORT FOR COMPARISON. 6/17 PVL Conclusions: Hemodynamics in the right common femoral vein are consistent with more proximal venous obstruction. No evidence of deep venous thrombosis in the right  femoral, deep femoral, popliteal, posterior tibial and peroneal veins. Technically compromised study therefore non-occlusive deep venous thrombosis cannot be excluded in the right common femoral vein as described in the findings.   
Reflux evaluation is deferred. 1/17 Nuc Stress Conclusion: 1. Normal perfusion scan. 2. Normal wall motion and ejection fraction. 3. Low risk scan. Visit Vitals BP (!) 145/91 Pulse 91 Ht 5' 1\" (1.549 m) Wt 73.9 kg (163 lb) BMI 30.80 kg/m² Ms. Jolynn Herrera has a reminder for a \"due or due soon\" health maintenance. I have asked that she contact her primary care provider for follow-up on this health maintenance. Physical Exam  
Constitutional: She is oriented to person, place, and time. She appears well-developed and well-nourished. No distress. obese HENT:  
Head: Normocephalic and atraumatic. Mouth/Throat: Normal dentition. Eyes: Right eye exhibits no discharge. Left eye exhibits no discharge. No scleral icterus. Neck: Neck supple. No JVD present. Carotid bruit is not present. No thyromegaly present. Cardiovascular: Normal rate, regular rhythm, S1 normal, S2 normal, normal heart sounds and intact distal pulses. Exam reveals no gallop and no friction rub. No murmur heard. Pulmonary/Chest: Effort normal and breath sounds normal. She has no wheezes. She has no rales. She exhibits no tenderness. Abdominal: Soft. She exhibits no mass. There is no tenderness. Musculoskeletal: She exhibits no edema. Lymphadenopathy:  
     Right cervical: No superficial cervical adenopathy present. Left cervical: No superficial cervical adenopathy present. Neurological: She is alert and oriented to person, place, and time. Skin: Skin is warm and dry. No rash noted. Psychiatric: She has a normal mood and affect. Her behavior is normal.  
 
 
ASSESSMENT and PLAN Patient is fairly compliant with medications but blood pressure is elevated. Increase metoprolol and follow the home chart. Diet weight and exercise discussed. Diagnoses and all orders for this visit: 
 
1. Chronic diastolic congestive heart failure (HCC) 
-     TSH 3RD GENERATION; Future 2. Palpitations Comments: 
9/17 isaac Curtis metoprolol; reduce HCTZ Orders: 
-     hydroCHLOROthiazide (HYDRODIURIL) 25 mg tablet; Take 1 Tab by mouth every other day. Indications: high blood pressure 
-     metoprolol tartrate (LOPRESSOR) 100 mg IR tablet; Take 1 Tab by mouth two (2) times a day. Indications: high blood pressure 3. Essential hypertension Comments: 
controlled; reduce HCTZ./aldactone; incr bb Orders: 
-     spironolactone (ALDACTONE) 25 mg tablet; Take 1 Tab by mouth daily. -     hydroCHLOROthiazide (HYDRODIURIL) 25 mg tablet; Take 1 Tab by mouth every other day. Indications: high blood pressure -     METABOLIC PANEL, BASIC; Future -     CBC W/O DIFF; Future -     LIPID PANEL; Future 
-     HEPATIC FUNCTION PANEL; Future 
-     TSH 3RD GENERATION; Future 4. Obesity (BMI 30.0-34.9) 5. S/P ablation of atrial fibrillation Pertinent laboratory and test data reviewed and discussed with patient. See patient instructions also for other medical advice given Medications Discontinued During This Encounter Medication Reason  spironolactone (ALDACTONE) 25 mg tablet Reorder  hydroCHLOROthiazide (HYDRODIURIL) 25 mg tablet Reorder  metoprolol tartrate (LOPRESSOR) 50 mg tablet Follow-up and Dispositions · Return in about 6 months (around 10/19/2019), or if symptoms worsen or fail to improve, for post test.

## 2019-04-19 NOTE — PROGRESS NOTES
1. Have you been to the ER, urgent care clinic since your last visit? Hospitalized since your last visit? No  
 
2. Have you seen or consulted any other health care providers outside of the [address] [place] [address] since your last visit? Include any pap smears or colon screening. Yes Where: PCP 3. Since your last visit, have you had any of the following symptoms? .  
 
   
 
4. Have you had any blood work, X-rays or cardiac testing? No 
 
  
 
5. Where do you normally have your labs drawn? [facility] 6. Do you need any refills today?    Yes

## 2019-10-17 ENCOUNTER — OFFICE VISIT (OUTPATIENT)
Dept: CARDIOLOGY CLINIC | Age: 54
End: 2019-10-17

## 2019-10-17 VITALS
HEIGHT: 61 IN | HEART RATE: 98 BPM | SYSTOLIC BLOOD PRESSURE: 155 MMHG | WEIGHT: 164 LBS | BODY MASS INDEX: 30.96 KG/M2 | DIASTOLIC BLOOD PRESSURE: 89 MMHG

## 2019-10-17 DIAGNOSIS — G47.33 OSA (OBSTRUCTIVE SLEEP APNEA): ICD-10-CM

## 2019-10-17 DIAGNOSIS — Z98.890 S/P ABLATION OF ATRIAL FIBRILLATION: ICD-10-CM

## 2019-10-17 DIAGNOSIS — I10 ESSENTIAL HYPERTENSION: ICD-10-CM

## 2019-10-17 DIAGNOSIS — I48.0 PAROXYSMAL ATRIAL FIBRILLATION (HCC): ICD-10-CM

## 2019-10-17 DIAGNOSIS — I50.32 CHRONIC DIASTOLIC CONGESTIVE HEART FAILURE (HCC): Primary | ICD-10-CM

## 2019-10-17 DIAGNOSIS — Z86.79 S/P ABLATION OF ATRIAL FIBRILLATION: ICD-10-CM

## 2019-10-17 DIAGNOSIS — E66.9 OBESITY (BMI 30.0-34.9): ICD-10-CM

## 2019-10-17 RX ORDER — METFORMIN HYDROCHLORIDE 500 MG/1
TABLET ORAL 2 TIMES DAILY WITH MEALS
COMMUNITY
End: 2021-02-05 | Stop reason: ALTCHOICE

## 2019-10-17 RX ORDER — LOSARTAN POTASSIUM 50 MG/1
50 TABLET ORAL DAILY
Qty: 90 TAB | Refills: 3 | Status: SHIPPED | OUTPATIENT
Start: 2019-10-17 | End: 2021-08-06

## 2019-10-17 NOTE — PROGRESS NOTES
1. Have you been to the ER, urgent care clinic since your last visit? Hospitalized since your last visit? No     2. Have you seen or consulted any other health care providers outside of the [address] [place] [address] since your last visit? Include any pap smears or colon screening. No     3. Since your last visit, have you had any of the following symptoms? .       4. Have you had any blood work, X-rays or cardiac testing? Yes Where: Dr Gomez/PCP Reason for visit: Labs        5. Where do you normally have your labs drawn? Dr Brice Dawson    6. Do you need any refills today?    No

## 2019-10-17 NOTE — PATIENT INSTRUCTIONS
After the recommended changes have been made in blood pressure medicines, patient advised to keep BP/HR(pulse rate) chart twice daily and bring us results in next 2 weeks or so. Patient may send the results via \"My Chart\" if desired. Please rest for 5-10 minutes before checking blood pressure    Have blood work drawn in 1 week after starting new blood pressure medication. Heart-Healthy Diet: Care Instructions  Your Care Instructions    A heart-healthy diet has lots of vegetables, fruits, nuts, beans, and whole grains, and is low in salt. It limits foods that are high in saturated fat, such as meats, cheeses, and fried foods. It may be hard to change your diet, but even small changes can lower your risk of heart attack and heart disease. Follow-up care is a key part of your treatment and safety. Be sure to make and go to all appointments, and call your doctor if you are having problems. It's also a good idea to know your test results and keep a list of the medicines you take. How can you care for yourself at home? Watch your portions  · Learn what a serving is. A \"serving\" and a \"portion\" are not always the same thing. Make sure that you are not eating larger portions than are recommended. For example, a serving of pasta is ½ cup. A serving size of meat is 2 to 3 ounces. A 3-ounce serving is about the size of a deck of cards. Measure serving sizes until you are good at [place]" them. Keep in mind that restaurants often serve portions that are 2 or 3 times the size of one serving. · To keep your energy level up and keep you from feeling hungry, eat often but in smaller portions. · Eat only the number of calories you need to stay at a healthy weight. If you need to lose weight, eat fewer calories than your body burns (through exercise and other physical activity). Eat more fruits and vegetables  · Eat a variety of fruit and vegetables every day.  Dark green, deep orange, red, or yellow fruits and vegetables are especially good for you. Examples include spinach, carrots, peaches, and berries. · Keep carrots, celery, and other veggies handy for snacks. Buy fruit that is in season and store it where you can see it so that you will be tempted to eat it. · Cook dishes that have a lot of veggies in them, such as stir-fries and soups. Limit saturated and trans fat  · Read food labels, and try to avoid saturated and trans fats. They increase your risk of heart disease. Trans fat is found in many processed foods such as cookies and crackers. · Use olive or canola oil when you cook. Try cholesterol-lowering spreads, such as Benecol or Take Control. · Bake, broil, grill, or steam foods instead of frying them. · Choose lean meats instead of high-fat meats such as hot dogs and sausages. Cut off all visible fat when you prepare meat. · Eat fish, skinless poultry, and meat alternatives such as soy products instead of high-fat meats. Soy products, such as tofu, may be especially good for your heart. · Choose low-fat or fat-free milk and dairy products. Eat fish  · Eat at least two servings of fish a week. Certain fish, such as salmon and tuna, contain omega-3 fatty acids, which may help reduce your risk of heart attack. Eat foods high in fiber  · Eat a variety of grain products every day. Include whole-grain foods that have lots of fiber and nutrients. Examples of whole-grain foods include oats, whole wheat bread, and brown rice. · Buy whole-grain breads and cereals, instead of white bread or pastries. Limit salt and sodium  · Limit how much salt and sodium you eat to help lower your blood pressure. · Taste food before you salt it. Add only a little salt when you think you need it. With time, your taste buds will adjust to less salt. · Eat fewer snack items, fast foods, and other high-salt, processed foods. Check food labels for the amount of sodium in packaged foods.   · Choose low-sodium versions of canned goods (such as soups, vegetables, and beans). Limit sugar  · Limit drinks and foods with added sugar. These include candy, desserts, and soda pop. Limit alcohol  · Limit alcohol to no more than 2 drinks a day for men and 1 drink a day for women. Too much alcohol can cause health problems. When should you call for help? Watch closely for changes in your health, and be sure to contact your doctor if:    · You would like help planning heart-healthy meals. Where can you learn more? Go to http://deedee-jacklyn.info/. Enter V137 in the search box to learn more about \"Heart-Healthy Diet: Care Instructions. \"  Current as of: April 9, 2019  Content Version: 12.2  © 7473-8820 [employer]. Care instructions adapted under license by [employer] (which disclaims liability or warranty for this information). If you have questions about a medical condition or this instruction, always ask your healthcare professional. [place] [address] any warranty or liability for your use of this information.

## 2019-10-17 NOTE — PROGRESS NOTES
HISTORY OF PRESENT ILLNESS  Ruiz Galvez is a 49 y.o. female. Palpitations    The history is provided by the patient. This is a new problem. The current episode started more than 1 week ago (yrs). The problem has been resolved (PAF). The problem occurs every several days (2/wk). On average, each episode lasts 1 hour. The problem is associated with nothing. Associated symptoms include lower extremity edema. Pertinent negatives include no diaphoresis, no fever, no malaise/fatigue, no chest pain, no claudication, no orthopnea, no PND, no nausea, no vomiting, no headaches, no dizziness, no cough and no shortness of breath. Her past medical history is significant for hypertension. Hypertension   The history is provided by the medical records. This is a chronic problem. Pertinent negatives include no chest pain, no headaches and no shortness of breath. Medication Refill   The history is provided by the patient. Pertinent negatives include no chest pain, no headaches and no shortness of breath. Chest Pain (Angina)    The history is provided by the patient. This is a new problem. The current episode started more than 1 week ago (1/16). The problem has been resolved. Duration of episode(s) is 15 minutes. The problem occurs every several days. The pain is associated with rest, normal activity and movement. The pain is present in the substernal region. The quality of the pain is described as vice-like and tightness. The pain radiates to the left jaw, right jaw, mid back and left arm. Associated symptoms include lower extremity edema. Pertinent negatives include no claudication, no cough, no diaphoresis, no dizziness, no fever, no headaches, no malaise/fatigue, no nausea, no orthopnea, no palpitations, no PND, no shortness of breath and no vomiting. She has tried nothing for the symptoms. Leg Swelling   The history is provided by the patient. This is a recurrent problem.  The current episode started more than 1 week ago. The problem occurs every several days. The problem has been resolved. Pertinent negatives include no chest pain, no headaches and no shortness of breath. The symptoms are aggravated by standing. The symptoms are relieved by sleep. Shortness of Breath   The history is provided by the patient. This is a new problem. The problem occurs rarely. The current episode started more than 1 week ago. The problem has not changed since onset. Pertinent negatives include no fever, no headaches, no cough, no wheezing, no PND, no orthopnea, no chest pain, no vomiting, no rash, no leg swelling and no claudication. The problem's precipitants include exercise (sometimes while talking; 9/17 rushing across the highway). Review of Systems   Constitutional: Negative for chills, diaphoresis, fever, malaise/fatigue and weight loss. HENT: Negative for nosebleeds. Eyes: Negative for discharge. Respiratory: Negative for cough, shortness of breath and wheezing. Cardiovascular: Negative for chest pain, palpitations, orthopnea, claudication, leg swelling and PND. Gastrointestinal: Negative for diarrhea, nausea and vomiting. Genitourinary: Negative for dysuria and hematuria. Musculoskeletal: Negative for joint pain. Skin: Negative for rash. Neurological: Negative for dizziness, seizures, loss of consciousness and headaches. Endo/Heme/Allergies: Negative for polydipsia. Does not bruise/bleed easily. Psychiatric/Behavioral: Negative for depression and substance abuse. The patient does not have insomnia.       No Known Allergies    Past Medical History:   Diagnosis Date    Atrial fibrillation ([facility] [address] 1992    Essential hypertension 8/8/2017    Palpitations 1/27/2017 1/17 rare but tody lasted 1 hr d/w pt- will wait for more testing    Thyroid disease        Family History   Problem Relation Age of Onset    Heart Attack Father 48    Stroke Father     Heart Surgery Father        Social History     Tobacco Use    Smoking status: Former Smoker     Last attempt to quit: 1/10/1982     Years since quittin.7    Smokeless tobacco: Never Used   Substance Use Topics    Alcohol use: Yes     Comment: rare wine glass <1/month    Drug use: No        Current Outpatient Medications   Medication Sig    metFORMIN (GLUCOPHAGE) 500 mg tablet Take  by mouth two (2) times daily (with meals).  losartan (COZAAR) 50 mg tablet Take 1 Tab by mouth daily.  apixaban (ELIQUIS) 5 mg tablet Take 1 Tab by mouth two (2) times a day.  spironolactone (ALDACTONE) 25 mg tablet Take 1 Tab by mouth daily.  hydroCHLOROthiazide (HYDRODIURIL) 25 mg tablet Take 1 Tab by mouth every other day. Indications: high blood pressure    metoprolol tartrate (LOPRESSOR) 100 mg IR tablet Take 1 Tab by mouth two (2) times a day. Indications: high blood pressure    traMADol (ULTRAM) 50 mg tablet Take 1 Tab by mouth three (3) times daily. Max Daily Amount: 150 mg. Indications: Pain (Patient taking differently: Take 50 mg by mouth every six (6) hours as needed. Indications: Pain)    cholecalciferol (VITAMIN D3) 1,000 unit cap Take  by mouth daily.  nitroglycerin (NITROSTAT) 0.4 mg SL tablet 1 Tab by SubLINGual route every five (5) minutes as needed for Chest Pain for up to 3 doses. No current facility-administered medications for this visit. Past Surgical History:   Procedure Laterality Date    HX PARTIAL THYROIDECTOMY  2014    nodule with trach shift       Diagnostic Studies:  I have reviewed the relevant tests done on the patient and show as follows  EKG tracings reviewed by me today. CARDIOLOGY STUDIES 2017   Event Monitor Result 2 wks- STach with activation and baseline      CTA Heart/Lungs/PV  The coronary arteries have normal origins and courses. There are no   distinct coronary calcifications identified, though this study was not   optimized for coronary artery evaluation.        The limited images of the upper abdomen are unremarkable. IMPRESSION:   1. Normal pulmonary venous anatomy without pulmonary vein stenosis. 2. Mild biatrial enlargement. No left atrial or left atrial appendage   thrombus. 5/17 ECHO  ECHO CARDIOGRAM [address]  Component Name Value Ref Range   EF Echo 65     Result Impression   :   DECREASED LEFT VENTRICULAR CAVITY SIZE WITH LEFT VENTRICULAR HYPERTROPHY PRESENT. NORMAL GLOBAL LEFT VENTRICULAR SYSTOLIC FUNCTION WITH AN ESTIMATED EJECTION FRACTION OF   65%. MILD DIASTOLIC DYSFUNCTION. MITRAL ANNULAR CALCIFICATION. SCLEROTIC TRILEAFLET AORTIC VALVE WITHOUT EVIDENCE OF STENOSIS. TRACE OF TRICUSPID REGURGITATION WITH A NORMAL PULMONARY ARTERY PRESSURE   STRUCTURALLY NORMAL PULMONIC VALVE. NO EVIDENCE OF PERICARDIAL EFFUSION. NO MASSES, SHUNTS OR THROMBI SEEN. NO PREVIOUS REPORT FOR COMPARISON. 6/17 PVL  Conclusions: Hemodynamics in the right common femoral vein are consistent with more proximal venous obstruction. No evidence of deep venous thrombosis in the right  femoral, deep femoral, popliteal, posterior tibial and peroneal veins. Technically compromised study therefore non-occlusive deep venous thrombosis cannot be excluded in the right common femoral vein as described in the findings.    Reflux evaluation is deferred. 1/17 Nuc Stress  Conclusion:   1. Normal perfusion scan. 2. Normal wall motion and ejection fraction. 3. Low risk scan. Visit Vitals  /89   Pulse 98   Ht 5' 1\" (1.549 m)   Wt 74.4 kg (164 lb)   BMI 30.99 kg/m²       Ms. Babs Kawasaki has a reminder for a \"due or due soon\" health maintenance. I have asked that she contact her primary care provider for follow-up on this health maintenance. Physical Exam   Constitutional: She is oriented to person, place, and time. She appears well-developed and well-nourished. No distress. obese   HENT:   Head: Normocephalic and atraumatic. Mouth/Throat: Normal dentition.    Eyes: Right eye exhibits no discharge. Left eye exhibits no discharge. No scleral icterus. Neck: Neck supple. No JVD present. Carotid bruit is not present. No thyromegaly present. Cardiovascular: Normal rate, regular rhythm, S1 normal, S2 normal, normal heart sounds and intact distal pulses. Exam reveals no gallop and no friction rub. No murmur heard. Pulmonary/Chest: Effort normal and breath sounds normal. She has no wheezes. She has no rales. She exhibits no tenderness. Abdominal: Soft. She exhibits no mass. There is no tenderness. Musculoskeletal: She exhibits no edema. Lymphadenopathy:        Right cervical: No superficial cervical adenopathy present. Left cervical: No superficial cervical adenopathy present. Neurological: She is alert and oriented to person, place, and time. Skin: Skin is warm and dry. No rash noted. Psychiatric: She has a normal mood and affect. Her behavior is normal.   Nursing note and vitals reviewed. ASSESSMENT and PLAN      Patient is fairly compliant with medications but blood pressure is elevated. Increase metoprolol and follow the home chart. Diet weight and exercise discussed. 10/2019  History of PAF s/p ablation. She has had no reoccurrence of Atrial fibrillation. She was previously on Coumadin, then Eliquis for DVT, but was stopped approximately 1 year ago by surgeon. [id number] = 5  Blood pressure is elevated in office today. She reports is typically 890L systolic at home. She also a newly diagnosed diabetic. Will add Losartan 50 mg, and follow home chart. She is to have labs drawn today (CBC, TSH, CMP, and lipids). Diet and exercise encouraged to promote weight loss. Diagnoses and all orders for this visit:    1. Chronic diastolic congestive heart failure (HCC)    2. Paroxysmal atrial fibrillation (HCC)  -     apixaban (ELIQUIS) 5 mg tablet; Take 1 Tab by mouth two (2) times a day. 3. S/P ablation of atrial fibrillation    4.  Essential hypertension  -     losartan (COZAAR) 50 mg tablet; Take 1 Tab by mouth daily. 5. Obesity (BMI 30.0-34.9)    6. VEL (obstructive sleep apnea)      Atrial Fibrillation CHADSVASC2 Score Stroke Risk:   49 y.o. <65        + 0    female Female +1   CHF HX: Yes    +1   HTN HX: Yes    +1   Stroke/TIA/Thromboembolism No    +0   Vascular Disease HX: No    + 0   Diabetes Mellitus Yes    +1   CHADSVASC 2 Score 4      Annual Stroke Risk 4.8%- moderate-high          LLE DVT in 2017     Pertinent laboratory and test data reviewed and discussed with patient. See patient instructions also for other medical advice given    Medications Discontinued During This Encounter   Medication Reason    warfarin (COUMADIN) 5 mg tablet        Follow-up and Dispositions    · Return in about 6 months (around 4/17/2020), or if symptoms worsen or fail to improve. MOMO Gray  I have independently evaluated and examined the patient. CHF is stable. Blood pressure is elevated. Add losartan and follow home chart. BMP in 1 week. Physical exam is unremarkable today. Diet weight and exercise discussed. Restart Eliquis as chads vasc score is high to reduce the risk of stroke. Discussed with patient and she understands the risks and complications. Lipids are pending and will also be ordered. All relevant labs and testing data's are reviewed. Care plan discussed and updated after review.   Mana Nunez MD

## 2019-10-22 LAB
ALBUMIN SERPL-MCNC: 4.9 G/DL (ref 3.5–5.5)
ALP SERPL-CCNC: 67 IU/L (ref 39–117)
ALT SERPL-CCNC: 18 IU/L (ref 0–32)
AST SERPL-CCNC: 12 IU/L (ref 0–40)
BILIRUB DIRECT SERPL-MCNC: 0.14 MG/DL (ref 0–0.4)
BILIRUB SERPL-MCNC: 0.5 MG/DL (ref 0–1.2)
BUN SERPL-MCNC: 8 MG/DL (ref 6–24)
BUN/CREAT SERPL: 14 (ref 9–23)
CALCIUM SERPL-MCNC: 10 MG/DL (ref 8.7–10.2)
CHLORIDE SERPL-SCNC: 99 MMOL/L (ref 96–106)
CHOLEST SERPL-MCNC: 151 MG/DL (ref 100–199)
CO2 SERPL-SCNC: 24 MMOL/L (ref 20–29)
CREAT SERPL-MCNC: 0.56 MG/DL (ref 0.57–1)
ERYTHROCYTE [DISTWIDTH] IN BLOOD BY AUTOMATED COUNT: 14.1 % (ref 12.3–15.4)
GLUCOSE SERPL-MCNC: 198 MG/DL (ref 65–99)
HCT VFR BLD AUTO: 42.1 % (ref 34–46.6)
HDLC SERPL-MCNC: 43 MG/DL
HGB BLD-MCNC: 14 G/DL (ref 11.1–15.9)
LDLC SERPL CALC-MCNC: 91 MG/DL (ref 0–99)
MCH RBC QN AUTO: 27.2 PG (ref 26.6–33)
MCHC RBC AUTO-ENTMCNC: 33.3 G/DL (ref 31.5–35.7)
MCV RBC AUTO: 82 FL (ref 79–97)
PLATELET # BLD AUTO: 408 X10E3/UL (ref 150–450)
POTASSIUM SERPL-SCNC: 4.6 MMOL/L (ref 3.5–5.2)
PROT SERPL-MCNC: 7.6 G/DL (ref 6–8.5)
RBC # BLD AUTO: 5.15 X10E6/UL (ref 3.77–5.28)
SODIUM SERPL-SCNC: 139 MMOL/L (ref 134–144)
SPECIMEN STATUS REPORT, ROLRST: NORMAL
TRIGL SERPL-MCNC: 86 MG/DL (ref 0–149)
TSH SERPL DL<=0.005 MIU/L-ACNC: 1.83 UIU/ML (ref 0.45–4.5)
VLDLC SERPL CALC-MCNC: 17 MG/DL (ref 5–40)
WBC # BLD AUTO: 7.7 X10E3/UL (ref 3.4–10.8)

## 2019-10-23 ENCOUNTER — TELEPHONE (OUTPATIENT)
Dept: CARDIOLOGY CLINIC | Age: 54
End: 2019-10-23

## 2019-10-23 NOTE — TELEPHONE ENCOUNTER
----- Message from Sean Garrison NP sent at 10/23/2019  1:31 PM EDT -----  Please let her know   Labs reviewed  Needs to f/u with PCP regarding elevated glucose - no other significant lab abnormality  Thanks   ----- Message -----  From: Malina Vela Lab Results In  Sent: 10/22/2019  12:35 PM EDT  To:  Mark Rowley MD

## 2019-10-23 NOTE — TELEPHONE ENCOUNTER
Called and spoke to patient regarding lab/test Per Ben Brice NP, lab reviewed need f/u up with pcp regarding elevated glucose- no other significant lab abnormality. She voices understanding and acceptance of this advice and will call back if any further questions or concerns.

## 2020-04-17 ENCOUNTER — VIRTUAL VISIT (OUTPATIENT)
Dept: CARDIOLOGY CLINIC | Age: 55
End: 2020-04-17

## 2020-04-17 VITALS — BODY MASS INDEX: 30.99 KG/M2 | HEIGHT: 61 IN

## 2020-04-17 DIAGNOSIS — I50.32 CHRONIC DIASTOLIC CONGESTIVE HEART FAILURE (HCC): Primary | ICD-10-CM

## 2020-04-17 DIAGNOSIS — Z98.890 S/P ABLATION OF ATRIAL FIBRILLATION: ICD-10-CM

## 2020-04-17 DIAGNOSIS — G47.33 OSA (OBSTRUCTIVE SLEEP APNEA): ICD-10-CM

## 2020-04-17 DIAGNOSIS — I50.32 CHRONIC DIASTOLIC CONGESTIVE HEART FAILURE (HCC): ICD-10-CM

## 2020-04-17 DIAGNOSIS — I10 ESSENTIAL HYPERTENSION: ICD-10-CM

## 2020-04-17 DIAGNOSIS — Z86.79 S/P ABLATION OF ATRIAL FIBRILLATION: ICD-10-CM

## 2020-04-17 DIAGNOSIS — I48.0 PAROXYSMAL ATRIAL FIBRILLATION (HCC): ICD-10-CM

## 2020-04-17 DIAGNOSIS — E66.9 OBESITY (BMI 30.0-34.9): ICD-10-CM

## 2020-04-17 NOTE — PROGRESS NOTES
HISTORY OF PRESENT ILLNESS  Marlene Conroy is a 48 y.o. female. Marlene Conroy is a 48 y.o. female who was seen by synchronous (real-time) audio-video technology on 4/17/2020. Consent:  She and/or her healthcare decision maker is aware that this patient-initiated Telehealth encounter is a billable service, with coverage as determined by her insurance carrier. She is aware that she may receive a bill and has provided verbal consent to proceed: Yes    I was in the office while conducting this encounter. Hypertension   The history is provided by the medical records. This is a chronic problem. Associated symptoms include shortness of breath. Pertinent negatives include no chest pain and no headaches. Shortness of Breath   The history is provided by the patient. This is a new problem. The problem occurs rarely. The current episode started more than 1 week ago. The problem has been rapidly improving. Pertinent negatives include no fever, no headaches, no cough, no wheezing, no PND, no orthopnea, no chest pain, no vomiting, no rash, no leg swelling and no claudication. The problem's precipitants include exercise (sometimes while talking; 9/17 rushing across the highway; 4/20 walking about 2 miles). Palpitations    The history is provided by the patient. This is a new problem. The current episode started more than 1 week ago (yrs). The problem has been resolved (PAF). The problem occurs every several days (2/wk). On average, each episode lasts 1 hour. The problem is associated with nothing. Associated symptoms include lower extremity edema and shortness of breath. Pertinent negatives include no diaphoresis, no fever, no malaise/fatigue, no chest pain, no claudication, no orthopnea, no PND, no nausea, no vomiting, no headaches, no dizziness and no cough. Her past medical history is significant for hypertension. CHF   The history is provided by the medical records. This is a chronic problem.  Associated symptoms include shortness of breath. Pertinent negatives include no chest pain and no headaches. Review of Systems   Constitutional: Negative for chills, diaphoresis, fever, malaise/fatigue and weight loss. HENT: Negative for nosebleeds. Eyes: Negative for discharge. Respiratory: Positive for shortness of breath. Negative for cough and wheezing. Cardiovascular: Positive for palpitations. Negative for chest pain, orthopnea, claudication, leg swelling and PND. Gastrointestinal: Negative for diarrhea, nausea and vomiting. Genitourinary: Negative for dysuria and hematuria. Musculoskeletal: Negative for joint pain. Skin: Negative for rash. Neurological: Negative for dizziness, seizures, loss of consciousness and headaches. Endo/Heme/Allergies: Negative for polydipsia. Does not bruise/bleed easily. Psychiatric/Behavioral: Negative for depression and substance abuse. The patient does not have insomnia. No Known Allergies    Past Medical History:   Diagnosis Date    Atrial fibrillation ([facility] [address]     Essential hypertension 2017    Palpitations 2017 rare but tody lasted 1 hr d/w pt- will wait for more testing    Thyroid disease        Family History   Problem Relation Age of Onset    Heart Attack Father 48    Stroke Father     Heart Surgery Father        Social History     Tobacco Use    Smoking status: Former Smoker     Last attempt to quit: 1/10/1982     Years since quittin.2    Smokeless tobacco: Never Used   Substance Use Topics    Alcohol use: Yes     Comment: rare wine glass <1/month    Drug use: No        Current Outpatient Medications   Medication Sig    metFORMIN (GLUCOPHAGE) 500 mg tablet Take  by mouth two (2) times daily (with meals).  losartan (COZAAR) 50 mg tablet Take 1 Tab by mouth daily.  spironolactone (ALDACTONE) 25 mg tablet Take 1 Tab by mouth daily.  hydroCHLOROthiazide (HYDRODIURIL) 25 mg tablet Take 1 Tab by mouth every other day. Indications: high blood pressure    metoprolol tartrate (LOPRESSOR) 100 mg IR tablet Take 1 Tab by mouth two (2) times a day. Indications: high blood pressure    traMADol (ULTRAM) 50 mg tablet Take 1 Tab by mouth three (3) times daily. Max Daily Amount: 150 mg. Indications: Pain (Patient taking differently: Take 50 mg by mouth every six (6) hours as needed. Indications: Pain)    cholecalciferol (VITAMIN D3) 1,000 unit cap Take  by mouth daily.  nitroglycerin (NITROSTAT) 0.4 mg SL tablet 1 Tab by SubLINGual route every five (5) minutes as needed for Chest Pain for up to 3 doses. No current facility-administered medications for this visit. Past Surgical History:   Procedure Laterality Date    HX PARTIAL THYROIDECTOMY  2014    nodule with trach shift       Diagnostic Studies:  I have reviewed the relevant tests done on the patient and show as follows  EKG tracings reviewed by me today. CARDIOLOGY STUDIES 8/8/2017   Event Monitor Result 2 wks- STach with activation and baseline     6/17 CTA Heart/Lungs/PV  The coronary arteries have normal origins and courses. There are no   distinct coronary calcifications identified, though this study was not   optimized for coronary artery evaluation. The limited images of the upper abdomen are unremarkable. IMPRESSION:   1. Normal pulmonary venous anatomy without pulmonary vein stenosis. 2. Mild biatrial enlargement. No left atrial or left atrial appendage   thrombus. 5/17 ECHO  ECHO CARDIOGRAM [address]  Component Name Value Ref Range   EF Echo 65     Result Impression   :   DECREASED LEFT VENTRICULAR CAVITY SIZE WITH LEFT VENTRICULAR HYPERTROPHY PRESENT. NORMAL GLOBAL LEFT VENTRICULAR SYSTOLIC FUNCTION WITH AN ESTIMATED EJECTION FRACTION OF   65%. MILD DIASTOLIC DYSFUNCTION. MITRAL ANNULAR CALCIFICATION. SCLEROTIC TRILEAFLET AORTIC VALVE WITHOUT EVIDENCE OF STENOSIS.    TRACE OF TRICUSPID REGURGITATION WITH A NORMAL PULMONARY ARTERY PRESSURE   STRUCTURALLY NORMAL PULMONIC VALVE. NO EVIDENCE OF PERICARDIAL EFFUSION. NO MASSES, SHUNTS OR THROMBI SEEN. NO PREVIOUS REPORT FOR COMPARISON. 6/17 PVL  Conclusions: Hemodynamics in the right common femoral vein are consistent with more proximal venous obstruction. No evidence of deep venous thrombosis in the right  femoral, deep femoral, popliteal, posterior tibial and peroneal veins. Technically compromised study therefore non-occlusive deep venous thrombosis cannot be excluded in the right common femoral vein as described in the findings.    Reflux evaluation is deferred. 1/17 Nuc Stress  Conclusion:   1. Normal perfusion scan. 2. Normal wall motion and ejection fraction. 3. Low risk scan. Visit Vitals  Ht 5' 1\" (1.549 m)   BMI 30.99 kg/m²       Ms. Rolo aYn has a reminder for a \"due or due soon\" health maintenance. I have asked that she contact her primary care provider for follow-up on this health maintenance. Physical Exam   Constitutional: She is oriented to person, place, and time. She appears well-developed and well-nourished. No distress. HENT:   Head: Normocephalic and atraumatic. Nose: Nose normal.   Eyes: Conjunctivae and EOM are normal. Right eye exhibits no discharge. Left eye exhibits no discharge. No scleral icterus. Neck: Normal range of motion. No JVD present. No thyromegaly present. Pulmonary/Chest: Effort normal. No accessory muscle usage or stridor. Abdominal: Soft. She exhibits no distension. Musculoskeletal: Normal range of motion. General: No edema. Neurological: She is alert and oriented to person, place, and time. Gait normal.   No obvious facial asymmetry   Skin: No rash noted. No erythema. Psychiatric: She has a normal mood and affect. Her behavior is normal.       ASSESSMENT and PLAN      HLD : Results for Luba Jackson (MRN [id number]) as of 4/17/2020 10:08   Ref.  Range 10/21/2019 00:00 Triglyceride Latest Ref Range: 0 - 149 mg/dL 86   Cholesterol, total Latest Ref Range: 100 - 199 mg/dL 151   HDL Cholesterol Latest Ref Range: >39 mg/dL 43   VLDL, calculated Latest Ref Range: 5 - 40 mg/dL 17   LDL, calculated Latest Ref Range: 0 - 99 mg/dL 91           Atrial Fibrillation CHADSVASC2 Score Stroke Risk:   48 y.o. <65        + 0    female Female +1   CHF HX: Yes    +1   HTN HX: Yes    +1   Stroke/TIA/Thromboembolism No    +0   Vascular Disease HX: No    + 0   Diabetes Mellitus Yes    +1   CHADSVASC 2 Score 4      Annual Stroke Risk 4.8%- moderate-high      History of PAF s/p ablation. She has had no reoccurrence of Atrial fibrillation. Could not afford Eliquis and so she stopped. [id number] = 5. She will check again the price with insurance and I will send a refill. Blood pressure is not obtained today but she checked it if couple of weeks ago when it was in 130s over 80s as per patient. Requested that she send us the home chart in a week. Congratulated on weight loss and encouraged to continue to lose some more weight. She says she can walk up to 2 miles which is a significant improvement. Diagnoses and all orders for this visit:    1. Chronic diastolic congestive heart failure (HCC)  -     METABOLIC PANEL, BASIC; Future    2. Paroxysmal atrial fibrillation (HCC)  -     apixaban (ELIQUIS) 5 mg tablet; Take 1 Tab by mouth two (2) times a day. 3. S/P ablation of atrial fibrillation    4. Essential hypertension    5. Obesity (BMI 30.0-34.9)    6. VEL (obstructive sleep apnea)        Pertinent laboratory and test data reviewed and discussed with patient.   See patient instructions also for other medical advice given    Medications Discontinued During This Encounter   Medication Reason    apixaban (ELIQUIS) 5 mg tablet Cost of Medication       Follow-up and Dispositions    · Return in about 3 months (around 7/17/2020), or if symptoms worsen or fail to improve, for post test. Vital Signs: (As obtained by patient/caregiver at home)  Visit Vitals  Ht 5' 1\" (1.549 m)   BMI 30.99 kg/m²          Other pertinent observable physical exam findings:-      We discussed the expected course, resolution and complications of the diagnosis(es) in detail. Medication risks, benefits, costs, interactions, and alternatives were discussed as indicated. I advised her to contact the office if her condition worsens, changes or fails to improve as anticipated. She expressed understanding with the diagnosis(es) and plan. Pursuant to the emergency declaration under the [facility] waiver authority and the [employer] Act, this Virtual  Visit was conducted, with patient's consent, to reduce the patient's risk of exposure to COVID-19 and provide continuity of care for an established patient. Services were provided through a video synchronous discussion virtually to substitute for in-person clinic visit.     Kathy Guzmán MD

## 2020-04-17 NOTE — PROGRESS NOTES
1. Have you been to the ER, urgent care clinic since your last visit? Hospitalized since your last visit?     no    2. Have you seen or consulted any other health care providers outside of the [address] [place] [address] since your last visit? Include any pap smears or colon screening. Yes Where: pcp     3. Since your last visit, have you had any of the following symptoms?      no  4. Have you had any blood work, X-rays or cardiac testing?     no5. Where do you normally have your labs drawn? y[facility]    6. Do you need any refills today?    no

## 2020-04-17 NOTE — LETTER
Annie Carrera 1965 4/17/2020 Dear Sachi Toscano MD 
 
I had the pleasure of evaluating  Ms. Elia Phoenix in office today. Below are the relevant portions of my assessment and plan of care. ICD-10-CM ICD-9-CM 1. Chronic diastolic congestive heart failure (HCC) B35.50 949.20 METABOLIC PANEL, BASIC  
  428.0 2. Paroxysmal atrial fibrillation (HCC) I48.0 427.31 apixaban (ELIQUIS) 5 mg tablet 3. S/P ablation of atrial fibrillation Z98.890 V45.89   
 Z86.79    
4. Essential hypertension I10 401.9   
5. Obesity (BMI 30.0-34. 9) E66.9 278.00   
6. VEL (obstructive sleep apnea) G47.33 327.23 Current Outpatient Medications Medication Sig Dispense Refill  apixaban (ELIQUIS) 5 mg tablet Take 1 Tab by mouth two (2) times a day. 180 Tab 3  
 metFORMIN (GLUCOPHAGE) 500 mg tablet Take  by mouth two (2) times daily (with meals).  losartan (COZAAR) 50 mg tablet Take 1 Tab by mouth daily. 90 Tab 3  
 spironolactone (ALDACTONE) 25 mg tablet Take 1 Tab by mouth daily. 90 Tab 1  
 hydroCHLOROthiazide (HYDRODIURIL) 25 mg tablet Take 1 Tab by mouth every other day. Indications: high blood pressure 90 Tab 1  
 metoprolol tartrate (LOPRESSOR) 100 mg IR tablet Take 1 Tab by mouth two (2) times a day. Indications: high blood pressure 180 Tab 3  
 traMADol (ULTRAM) 50 mg tablet Take 1 Tab by mouth three (3) times daily. Max Daily Amount: 150 mg. Indications: Pain (Patient taking differently: Take 50 mg by mouth every six (6) hours as needed. Indications: Pain) 20 Tab 0  cholecalciferol (VITAMIN D3) 1,000 unit cap Take  by mouth daily.  nitroglycerin (NITROSTAT) 0.4 mg SL tablet 1 Tab by SubLINGual route every five (5) minutes as needed for Chest Pain for up to 3 doses. 25 Tab 0 Orders Placed This Encounter  METABOLIC PANEL, BASIC Standing Status:   Future Standing Expiration Date:   7/18/2020  apixaban (ELIQUIS) 5 mg tablet Sig: Take 1 Tab by mouth two (2) times a day. Dispense:  180 Tab Refill:  3 If you have questions, please do not hesitate to call me. I look forward to following Ms. Lorenza Valentine along with you. Sincerely, Cristina Swan MD

## 2020-04-17 NOTE — PATIENT INSTRUCTIONS
Medications Discontinued During This Encounter Medication Reason  apixaban (ELIQUIS) 5 mg tablet Cost of Medication After the recommended changes have been made in blood pressure medicines, patient advised to keep BP/HR(pulse rate) chart twice daily and bring us results in next 1 week or so. Patient may send the results via \"My Chart\" if desired. Please rest for 5-10 minutes before checking blood pressure. Sit on a comfortable chair without crossing the legs and put your arm on a table. We recommend that you use an upper arm cuff. Check the blood pressure 3 times weekly and take the lowest reading. If you check the blood pressure in both arms, use the higher reading. A Healthy Heart: Care Instructions Your Care Instructions Heart disease occurs when a substance called plaque builds up in the vessels that supply oxygen-rich blood to your heart. This can narrow the blood vessels and reduce blood flow. A heart attack happens when blood flow is completely blocked. A high-fat diet, smoking, and other factors increase the risk of heart disease. Your doctor has found that you have a chance of having heart disease. You can do lots of things to keep your heart healthy. It may not be easy, but you can change your diet, exercise more, and quit smoking. These steps really work to lower your chance of heart disease. Follow-up care is a key part of your treatment and safety. Be sure to make and go to all appointments, and call your doctor if you are having problems. It's also a good idea to know your test results and keep a list of the medicines you take. How can you care for yourself at home? Diet 
  · Use less salt when you cook and eat. This helps lower your blood pressure. Taste food before salting. Add only a little salt when you think you need it.  With time, your taste buds will adjust to less salt.  
  · Eat fewer snack items, fast foods, canned soups, and other high-salt, high-fat, processed foods.  
  · Read food labels and try to avoid saturated and trans fats. They increase your risk of heart disease by raising cholesterol levels.  
  · Limit the amount of solid fat-butter, margarine, and shortening-you eat. Use olive, peanut, or canola oil when you cook. Bake, broil, and steam foods instead of frying them.  
  · Eat a variety of fruit and vegetables every day. Dark green, deep orange, red, or yellow fruits and vegetables are especially good for you. Examples include spinach, carrots, peaches, and berries.  
  · Foods high in fiber can reduce your cholesterol and provide important vitamins and minerals. High-fiber foods include whole-grain cereals and breads, oatmeal, beans, brown rice, citrus fruits, and apples.  
  · Eat lean proteins. Heart-healthy proteins include seafood, lean meats and poultry, eggs, beans, peas, nuts, seeds, and soy products.  
  · Limit drinks and foods with added sugar. These include candy, desserts, and soda pop.  
 Lifestyle changes 
  · If your doctor recommends it, get more exercise. Walking is a good choice. Bit by bit, increase the amount you walk every day. Try for at least 30 minutes on most days of the week. You also may want to swim, bike, or do other activities.  
  · Do not smoke. If you need help quitting, talk to your doctor about stop-smoking programs and medicines. These can increase your chances of quitting for good. Quitting smoking may be the most important step you can take to protect your heart. It is never too late to quit. You will get health benefits right away.  
  · Limit alcohol to 2 drinks a day for men and 1 drink a day for women. Too much alcohol can cause health problems. Medicines 
  · Take your medicines exactly as prescribed. Call your doctor if you think you are having a problem with your medicine.  
  · If your doctor recommends aspirin, take the amount directed each day. Make sure you take aspirin and not another kind of pain reliever, such as acetaminophen (Tylenol). If you take ibuprofen (such as Advil or Motrin) for other problems, take aspirin at least 2 hours before taking ibuprofen. When should you call for help? Call 911 if you have symptoms of a heart attack. These may include: 
  · Chest pain or pressure, or a strange feeling in the chest.  
  · Sweating.  
  · Shortness of breath.  
  · Pain, pressure, or a strange feeling in the back, neck, jaw, or upper belly or in one or both shoulders or arms.  
  · Lightheadedness or sudden weakness.  
  · A fast or irregular heartbeat.  
 After you call  911, the  may tell you to chew 1 adult-strength or 2 to 4 low-dose aspirin. Wait for an ambulance. Do not try to drive yourself. 
 Watch closely for changes in your health, and be sure to contact your doctor if you have any problems. Where can you learn more? Go to http://deedee-jacklyn.info/ Enter S146 in the search box to learn more about \"A Healthy Heart: Care Instructions. \" Current as of: December 15, 2019Content Version: 12.4 © 5949-6283 [employer], [employer]. Care instructions adapted under license by [employer] (which disclaims liability or warranty for this information). If you have questions about a medical condition or this instruction, always ask your healthcare professional. [place] [address] any warranty or liability for your use of this information.

## 2021-02-05 ENCOUNTER — OFFICE VISIT (OUTPATIENT)
Dept: CARDIOLOGY CLINIC | Age: 56
End: 2021-02-05
Payer: MEDICAID

## 2021-02-05 VITALS
DIASTOLIC BLOOD PRESSURE: 78 MMHG | SYSTOLIC BLOOD PRESSURE: 137 MMHG | TEMPERATURE: 97.4 F | WEIGHT: 158 LBS | HEIGHT: 61 IN | BODY MASS INDEX: 29.83 KG/M2 | HEART RATE: 104 BPM

## 2021-02-05 DIAGNOSIS — E66.9 OBESITY (BMI 30.0-34.9): ICD-10-CM

## 2021-02-05 DIAGNOSIS — Z86.79 S/P ABLATION OF ATRIAL FIBRILLATION: ICD-10-CM

## 2021-02-05 DIAGNOSIS — I48.0 PAROXYSMAL ATRIAL FIBRILLATION (HCC): Primary | ICD-10-CM

## 2021-02-05 DIAGNOSIS — I10 ESSENTIAL HYPERTENSION: ICD-10-CM

## 2021-02-05 DIAGNOSIS — G47.33 OSA (OBSTRUCTIVE SLEEP APNEA): ICD-10-CM

## 2021-02-05 DIAGNOSIS — E11.9 TYPE 2 DIABETES MELLITUS WITHOUT COMPLICATION, WITHOUT LONG-TERM CURRENT USE OF INSULIN (HCC): ICD-10-CM

## 2021-02-05 DIAGNOSIS — Z98.890 S/P ABLATION OF ATRIAL FIBRILLATION: ICD-10-CM

## 2021-02-05 DIAGNOSIS — E78.00 HYPERCHOLESTEREMIA: ICD-10-CM

## 2021-02-05 PROCEDURE — 99215 OFFICE O/P EST HI 40 MIN: CPT | Performed by: INTERNAL MEDICINE

## 2021-02-05 PROCEDURE — 93000 ELECTROCARDIOGRAM COMPLETE: CPT | Performed by: INTERNAL MEDICINE

## 2021-02-05 RX ORDER — SITAGLIPTIN AND METFORMIN HYDROCHLORIDE 50; 1000 MG/1; MG/1
1 TABLET, FILM COATED ORAL 2 TIMES DAILY WITH MEALS
COMMUNITY

## 2021-02-05 RX ORDER — ATORVASTATIN CALCIUM 20 MG/1
TABLET, FILM COATED ORAL DAILY
COMMUNITY
End: 2021-02-05 | Stop reason: SDUPTHER

## 2021-02-05 RX ORDER — ALBUTEROL SULFATE 90 UG/1
AEROSOL, METERED RESPIRATORY (INHALATION)
COMMUNITY

## 2021-02-05 RX ORDER — ATORVASTATIN CALCIUM 40 MG/1
40 TABLET, FILM COATED ORAL DAILY
Qty: 90 TAB | Refills: 1 | Status: SHIPPED | OUTPATIENT
Start: 2021-02-05 | End: 2022-04-01 | Stop reason: SDUPTHER

## 2021-02-05 NOTE — PROGRESS NOTES
HISTORY OF PRESENT ILLNESS  Stuart Hernandez is a 48 y.o. female. Hypertension  The history is provided by the medical records. This is a chronic problem. Pertinent negatives include no chest pain, no headaches and no shortness of breath. Shortness of Breath  The history is provided by the patient. This is a new problem. The problem occurs rarely. The current episode started more than 1 week ago. The problem has been resolved. Pertinent negatives include no fever, no headaches, no cough, no wheezing, no PND, no orthopnea, no chest pain, no vomiting, no rash, no leg swelling and no claudication. The problem's precipitants include exercise (sometimes while talking; 9/17 rushing across the highway). Palpitations   The history is provided by the patient. This is a new problem. The current episode started more than 1 week ago (yrs). The problem has been resolved (PAF). The problem occurs every several days (2/wk). On average, each episode lasts 1 hour. The problem is associated with nothing. Associated symptoms include lower extremity edema. Pertinent negatives include no diaphoresis, no fever, no malaise/fatigue, no chest pain, no claudication, no orthopnea, no PND, no nausea, no vomiting, no headaches, no dizziness, no cough and no shortness of breath. Her past medical history is significant for hypertension. Medication Refill  The history is provided by the patient. Pertinent negatives include no chest pain, no headaches and no shortness of breath. CHF  Pertinent negatives include no chest pain, no headaches and no shortness of breath. Review of Systems   Constitutional: Negative for chills, diaphoresis, fever, malaise/fatigue and weight loss. HENT: Negative for nosebleeds. Eyes: Negative for discharge. Respiratory: Negative for cough, shortness of breath and wheezing. Cardiovascular: Positive for palpitations (PAF). Negative for chest pain, orthopnea, claudication, leg swelling and PND. Gastrointestinal: Negative for diarrhea, nausea and vomiting. Genitourinary: Negative for dysuria and hematuria. Musculoskeletal: Negative for joint pain. Skin: Negative for rash. Neurological: Negative for dizziness, seizures, loss of consciousness and headaches. Endo/Heme/Allergies: Negative for polydipsia. Does not bruise/bleed easily. Psychiatric/Behavioral: Negative for depression and substance abuse. The patient does not have insomnia. No Known Allergies    Past Medical History:   Diagnosis Date    Atrial fibrillation ([facility] [place] [address]     Essential hypertension 2017    Palpitations 2017 rare but tody lasted 1 hr d/w pt- will wait for more testing    Thyroid disease        Family History   Problem Relation Age of Onset    Heart Attack Father 48    Stroke Father     Heart Surgery Father        Social History     Tobacco Use    Smoking status: Former Smoker     Quit date: 1/10/1982     Years since quittin.0    Smokeless tobacco: Never Used   Substance Use Topics    Alcohol use: Yes     Comment: rare wine glass <1/month    Drug use: No        Current Outpatient Medications   Medication Sig    albuterol (Ventolin HFA) 90 mcg/actuation inhaler Take  by inhalation.  atorvastatin (LIPITOR) 20 mg tablet Take  by mouth daily.  SITagliptin-metFORMIN (Janumet) 50-1,000 mg per tablet Take 1 Tab by mouth two (2) times daily (with meals).  apixaban (ELIQUIS) 5 mg tablet Take 1 Tab by mouth two (2) times a day.  losartan (COZAAR) 50 mg tablet Take 1 Tab by mouth daily.  spironolactone (ALDACTONE) 25 mg tablet Take 1 Tab by mouth daily.  hydroCHLOROthiazide (HYDRODIURIL) 25 mg tablet Take 1 Tab by mouth every other day. Indications: high blood pressure    metoprolol tartrate (LOPRESSOR) 100 mg IR tablet Take 1 Tab by mouth two (2) times a day. Indications: high blood pressure    traMADol (ULTRAM) 50 mg tablet Take 1 Tab by mouth three (3) times daily.  Max Daily Amount: 150 mg. Indications: Pain (Patient taking differently: Take 50 mg by mouth every six (6) hours as needed. Indications: Pain)    cholecalciferol (VITAMIN D3) 1,000 unit cap Take  by mouth daily.  nitroglycerin (NITROSTAT) 0.4 mg SL tablet 1 Tab by SubLINGual route every five (5) minutes as needed for Chest Pain for up to 3 doses. No current facility-administered medications for this visit. Past Surgical History:   Procedure Laterality Date    HX PARTIAL THYROIDECTOMY  2014    nodule with trach shift       Diagnostic Studies:  I have reviewed the relevant tests done on the patient and show as follows  EKG tracings reviewed by me today. CARDIOLOGY STUDIES 8/8/2017   Event Monitor Result 2 wks- STach with activation and baseline     6/17 CTA Heart/Lungs/PV  The coronary arteries have normal origins and courses. There are no   distinct coronary calcifications identified, though this study was not   optimized for coronary artery evaluation. The limited images of the upper abdomen are unremarkable. IMPRESSION:   1. Normal pulmonary venous anatomy without pulmonary vein stenosis. 2. Mild biatrial enlargement. No left atrial or left atrial appendage   thrombus. 5/17 ECHO  ECHO CARDIOGRAM [address]  Component Name Value Ref Range   EF Echo 65     Result Impression   :   DECREASED LEFT VENTRICULAR CAVITY SIZE WITH LEFT VENTRICULAR HYPERTROPHY PRESENT. NORMAL GLOBAL LEFT VENTRICULAR SYSTOLIC FUNCTION WITH AN ESTIMATED EJECTION FRACTION OF   65%. MILD DIASTOLIC DYSFUNCTION. MITRAL ANNULAR CALCIFICATION. SCLEROTIC TRILEAFLET AORTIC VALVE WITHOUT EVIDENCE OF STENOSIS. TRACE OF TRICUSPID REGURGITATION WITH A NORMAL PULMONARY ARTERY PRESSURE   STRUCTURALLY NORMAL PULMONIC VALVE. NO EVIDENCE OF PERICARDIAL EFFUSION. NO MASSES, SHUNTS OR THROMBI SEEN. NO PREVIOUS REPORT FOR COMPARISON.        6/17 PVL  Conclusions: Hemodynamics in the right common femoral vein are consistent with more proximal venous obstruction. No evidence of deep venous thrombosis in the right  femoral, deep femoral, popliteal, posterior tibial and peroneal veins. Technically compromised study therefore non-occlusive deep venous thrombosis cannot be excluded in the right common femoral vein as described in the findings.    Reflux evaluation is deferred. 1/17 Nuc Stress  Conclusion:   1. Normal perfusion scan. 2. Normal wall motion and ejection fraction. 3. Low risk scan. Visit Vitals  /78   Pulse (!) 104   Temp 97.4 °F (36.3 °C)   Ht 5' 1\" (1.549 m)   Wt 71.7 kg (158 lb)   BMI 29.85 kg/m²       Ms. Chris Markham has a reminder for a \"due or due soon\" health maintenance. I have asked that she contact her primary care provider for follow-up on this health maintenance. Physical Exam   Constitutional: She is oriented to person, place, and time. She appears well-developed and well-nourished. No distress. obese   HENT:   Head: Normocephalic and atraumatic. Mouth/Throat: Normal dentition. Eyes: Right eye exhibits no discharge. Left eye exhibits no discharge. No scleral icterus. Neck: Neck supple. No JVD present. Carotid bruit is not present. No thyromegaly present. Cardiovascular: Normal rate, regular rhythm, S1 normal, S2 normal, normal heart sounds and intact distal pulses. Exam reveals no gallop and no friction rub. No murmur heard. Pulmonary/Chest: Effort normal and breath sounds normal. She has no wheezes. She has no rales. She exhibits no tenderness. Abdominal: Soft. She exhibits no mass. There is no abdominal tenderness. Musculoskeletal:         General: No edema. Lymphadenopathy:        Right cervical: No superficial cervical adenopathy present. Left cervical: No superficial cervical adenopathy present. Neurological: She is alert and oriented to person, place, and time. Skin: Skin is warm and dry. No rash noted.    Psychiatric: She has a normal mood and affect. Her behavior is normal.   Nursing note and vitals reviewed. ASSESSMENT and PLAN      Patient is fairly compliant with medications but blood pressure is elevated. Increase metoprolol and follow the home chart. Diet weight and exercise discussed. HLD : LIPID COMPLETE PANELResulted: 11/20/2020 4:42 PM  [address]  Component Name Value Ref Range   Cholesterol 210 (H) 110 - 200 mg/dL   Triglyceride 122 40 - 149 mg/dL   HDL 46 >=40 mg/dL   Cholesterol/HDL 4.6 0.0 - 5.0    Non-HDL Cholesterol 164 (H) <130 mg/dL   LDL CALCULATION 140 (H) 50 - 99 mg/dL   VLDL CALCULATION 24 8 - 30 mg/dL   LDL/HDL Ratio 3.1         10/2019  History of PAF s/p ablation. She has had no reoccurrence of Atrial fibrillation. She was previously on Coumadin, then Eliquis for DVT, but was stopped approximately 1 year ago by surgeon. [id number] = 5  Blood pressure is elevated in office today. She reports is typically 520A systolic at home. She also a newly diagnosed diabetic. Will add Losartan 50 mg, and follow home chart. She is to have labs drawn today (CBC, TSH, CMP, and lipids). Diet and exercise encouraged to promote weight loss. Diagnoses and all orders for this visit:    1. Paroxysmal atrial fibrillation (HCC)  -     AMB POC EKG ROUTINE W/ 12 LEADS, INTER & REP    2. Essential hypertension    3. S/P ablation of atrial fibrillation    4. Obesity (BMI 30.0-34.9)    5. VEL (obstructive sleep apnea)    6. Type 2 diabetes mellitus without complication, without long-term current use of insulin (HCC)      Atrial Fibrillation CHADSVASC2 Score Stroke Risk:   48 y.o. <65        + 0    female Female +1   CHF HX: Yes    +1   HTN HX: Yes    +1   Stroke/TIA/Thromboembolism No    +0   Vascular Disease HX: No    + 0   Diabetes Mellitus Yes    +1   CHADSVASC 2 Score 4      Annual Stroke Risk 4.8%- moderate-high          LLE DVT in 2017       Diagnoses and all orders for this visit:    1.  Paroxysmal atrial fibrillation (HCC)  -     AMB POC EKG ROUTINE W/ 12 LEADS, INTER & REP    2. Essential hypertension  -     METABOLIC PANEL, BASIC; Future    3. S/P ablation of atrial fibrillation    4. Obesity (BMI 30.0-34.9)    5. VEL (obstructive sleep apnea)    6. Type 2 diabetes mellitus without complication, without long-term current use of insulin (HCC)    7. Hypercholesteremia  -     atorvastatin (LIPITOR) 40 mg tablet; Take 1 Tab by mouth daily.  -     HEPATIC FUNCTION PANEL; Future  -     LIPID PANEL; Future        Pertinent laboratory and test data reviewed and discussed with patient. See patient instructions also for other medical advice given    Medications Discontinued During This Encounter   Medication Reason    metFORMIN (GLUCOPHAGE) 500 mg tablet Therapy Completed    atorvastatin (LIPITOR) 20 mg tablet REORDER       Follow-up and Dispositions    · Return in about 6 months (around 8/5/2021), or if symptoms worsen or fail to improve, for post test.     2/21 A. fib is staying in sinus rhythm. Will continue Eliquis. Blood pressure and heart rate are elevated. Patient taking metoprolol only once a day. She will start taking it twice a day. Follow home chart. She is losing weight gradually. Unfortunately not using CPAP. Diabetes is not controlled. Long discussion about diet. Mediterranean diet guidelines printed. Lipids are not controlled. Increase Lipitor and follow-up the labs. Periodic follow-up of electrolytes due to Aldactone use.

## 2021-02-05 NOTE — PATIENT INSTRUCTIONS
Medications Discontinued During This Encounter Medication Reason  metFORMIN (GLUCOPHAGE) 500 mg tablet Therapy Completed  atorvastatin (LIPITOR) 20 mg tablet REORDER After the recommended changes have been made in blood pressure medicines, patient advised to keep BP/HR(pulse rate) chart twice daily and bring us results in next 4 to 5 days. Patient may send the results via \"My Chart\" if desired. Please rest for 5-10 minutes before checking blood pressure. Sit on a comfortable chair without crossing the legs and put your arm on a table. We recommend that you use an upper arm cuff. Check the blood pressure 3 times each time you check the blood pressure and record the lowest reading. If you check the blood pressure in both arms, use the higher reading. Learning About the [address] [facility] Diet What is the Mediterranean diet? The Mediterranean diet is a style of eating rather than a diet plan. It features foods eaten in [place], [place], [place], and other countries along the [facility]. It emphasizes eating foods like fish, fruits, vegetables, beans, high-fiber breads and whole grains, nuts, and olive oil. This style of eating includes limited red meat, cheese, and sweets. Why choose the Mediterranean diet? A Mediterranean-style diet may improve heart health. It contains more fat than other heart-healthy diets. But the fats are mainly from nuts, unsaturated oils (such as fish oils and olive oil), and certain nut or seed oils (such as canola, soybean, or flaxseed oil). These fats may help protect the heart and blood vessels. How can you get started on the Mediterranean diet? Here are some things you can do to switch to a more Mediterranean way of eating. What to eat · Eat a variety of fruits and vegetables each day, such as grapes, blueberries, tomatoes, broccoli, peppers, figs, olives, spinach, eggplant, beans, lentils, and chickpeas. · Eat a variety of whole-grain foods each day, such as oats, brown rice, and whole wheat bread, pasta, and couscous. · Eat fish at least 2 times a week. Try tuna, salmon, mackerel, lake trout, herring, or sardines. · Eat moderate amounts of low-fat dairy products, such as milk, cheese, or yogurt. · Eat moderate amounts of poultry and eggs. · Choose healthy (unsaturated) fats, such as nuts, olive oil, and certain nut or seed oils like canola, soybean, and flaxseed. · Limit unhealthy (saturated) fats, such as butter, palm oil, and coconut oil. And limit fats found in animal products, such as meat and dairy products made with whole milk. Try to eat red meat only a few times a month in very small amounts. · Limit sweets and desserts to only a few times a week. This includes sugar-sweetened drinks like soda. The Mediterranean diet may also include red wine with your meal1 glass each day for women and up to 2 glasses a day for men. Tips for eating at home · Use herbs, spices, garlic, lemon zest, and citrus juice instead of salt to add flavor to foods. · Add avocado slices to your sandwich instead of conteh. · Have fish for lunch or dinner instead of red meat. Brush the fish with olive oil, and broil or grill it. · Sprinkle your salad with seeds or nuts instead of cheese. · Cook with olive or canola oil instead of butter or oils that are high in saturated fat. · Switch from 2% milk or whole milk to 1% or fat-free milk. · Dip raw vegetables in a vinaigrette dressing or hummus instead of dips made from mayonnaise or sour cream. 
· Have a piece of fruit for dessert instead of a piece of cake. Try baked apples, or have some dried fruit. Tips for eating out · Try broiled, grilled, baked, or poached fish instead of having it fried or breaded. · Ask your  to have your meals prepared with olive oil instead of butter. · Order dishes made with marinara sauce or sauces made from olive oil. Avoid sauces made from cream or mayonnaise. · Choose whole-grain breads, whole wheat pasta and pizza crust, brown rice, beans, and lentils. · Cut back on butter or margarine on bread. Instead, you can dip your bread in a small amount of olive oil. · Ask for a side salad or grilled vegetables instead of french fries or chips. Where can you learn more? Go to http://www.Setup/ Enter 275-868-5766 in the search box to learn more about \"Learning About the Mediterranean Diet. \" Current as of: August 22, 2019               Content Version: 12.6 © 6073-8352 [employer]. Care instructions adapted under license by [employer] (which disclaims liability or warranty for this information). If you have questions about a medical condition or this instruction, always ask your healthcare professional. [place] [address] any warranty or liability for your use of this information.

## 2021-02-05 NOTE — PROGRESS NOTES
1. Have you been to the ER, urgent care clinic since your last visit? Hospitalized since your last visit?     no  2. Have you seen or consulted any other health care providers outside of the [address] [place] [address] since your last visit? Include any pap smears or colon screening. Yes Where: pcp     3. Since your last visit, have you had any of the following symptoms?      no  4. Have you had any blood work, X-rays or cardiac testing? Yes Where: pcp         5. Where do you normally have your labs drawn?   pcp  6. Do you need any refills today?    no

## 2021-03-12 DIAGNOSIS — I10 ESSENTIAL HYPERTENSION: ICD-10-CM

## 2021-03-12 DIAGNOSIS — E78.00 HYPERCHOLESTEREMIA: ICD-10-CM

## 2021-03-24 LAB
ALBUMIN SERPL-MCNC: 4.5 G/DL (ref 3.8–4.9)
ALP SERPL-CCNC: 58 IU/L (ref 39–117)
ALT SERPL-CCNC: 18 IU/L (ref 0–32)
AST SERPL-CCNC: 14 IU/L (ref 0–40)
BILIRUB DIRECT SERPL-MCNC: 0.11 MG/DL (ref 0–0.4)
BILIRUB SERPL-MCNC: 0.4 MG/DL (ref 0–1.2)
CHOLEST SERPL-MCNC: 104 MG/DL (ref 100–199)
HDLC SERPL-MCNC: 37 MG/DL
LDLC SERPL CALC-MCNC: 54 MG/DL (ref 0–99)
PROT SERPL-MCNC: 7 G/DL (ref 6–8.5)
TRIGL SERPL-MCNC: 56 MG/DL (ref 0–149)
VLDLC SERPL CALC-MCNC: 13 MG/DL (ref 5–40)

## 2021-06-30 NOTE — PERIOP NOTES
PRE-SURGICAL INSTRUCTIONS        Patient's Name:  Tim DC Date:  6/30/2021              Surgery Date:  7/8/2021                1. Do NOT eat or drink anything, including candy, gum, or ice chips after midnight on 7/7/2021, unless you have specific instructions from your surgeon or anesthesia provider to do so.  2. You may brush your teeth before coming to the hospital.  3. No smoking 24 hours prior to the day of surgery. 4. No alcohol 24 hours prior to the day of surgery. 5. No recreational drugs for one week prior to the day of surgery. 6. Leave all valuables, including money/purse, at home. 7. Remove all jewelry, nail polish, acrylic nails, and makeup (including mascara); no lotions powders, deodorant, or perfume/cologne/after shave on the skin. 8. Glasses/contact lenses and dentures may be worn to the hospital.  They will be removed prior to surgery. 9. Call your doctor if symptoms of a cold or illness develop within 24-48 hours prior to your surgery. 10.  AN ADULT MUST DRIVE YOU HOME AFTER OUTPATIENT SURGERY. 11.  If you are having an outpatient procedure, please make arrangements for a responsible adult to be with you for 24 hours after your surgery. 12.  NO VISITORS in the hospital at this time for outpatient procedures. Exceptions may be made for surgical admissions, per nursing unit guidelines      Special Instructions:      Bring list of CURRENT medications. Bring inhaler. Bring any pertinent legal medical records. Take these medications the morning of surgery with a sip of water:Blood Pressure Medication  Follow physician instructions about oral diabetic medication. Follow physician instructions about stopping Eliquis. Complete bowel prep per MD instructions. On the day of surgery, come in the main entrance of [facility]. Let the  at the desk know you are there for surgery.   A staff member will come escort you to the surgical area on the second floor. If you have any questions or concerns, please do not hesitate to call:     (Prior to the day of surgery) PAT department:  [phone]   (Day of surgery) Pre-Op department:  [phone]    These surgical instructions were reviewed with patient during the PAT phone call.

## 2021-07-07 ENCOUNTER — ANESTHESIA EVENT (OUTPATIENT)
Dept: ENDOSCOPY | Age: 56
End: 2021-07-07
Payer: MEDICAID

## 2021-07-08 ENCOUNTER — HOSPITAL ENCOUNTER (OUTPATIENT)
Age: 56
Setting detail: OUTPATIENT SURGERY
Discharge: HOME | End: 2021-07-08
Attending: INTERNAL MEDICINE | Admitting: INTERNAL MEDICINE
Payer: MEDICAID

## 2021-07-08 ENCOUNTER — ANESTHESIA (OUTPATIENT)
Dept: ENDOSCOPY | Age: 56
End: 2021-07-08
Payer: MEDICAID

## 2021-07-08 VITALS
DIASTOLIC BLOOD PRESSURE: 78 MMHG | TEMPERATURE: 98.6 F | HEIGHT: 60 IN | BODY MASS INDEX: 31.61 KG/M2 | RESPIRATION RATE: 14 BRPM | OXYGEN SATURATION: 96 % | HEART RATE: 87 BPM | SYSTOLIC BLOOD PRESSURE: 130 MMHG | WEIGHT: 161 LBS

## 2021-07-08 LAB
GLUCOSE BLD STRIP.AUTO-MCNC: 132 MG/DL (ref 70–110)
GLUCOSE BLD STRIP.AUTO-MCNC: 158 MG/DL (ref 70–110)
HCG SERPL QL: NEGATIVE

## 2021-07-08 PROCEDURE — 76060000031 HC ANESTHESIA FIRST 0.5 HR: Performed by: INTERNAL MEDICINE

## 2021-07-08 PROCEDURE — 2709999900 HC NON-CHARGEABLE SUPPLY: Performed by: INTERNAL MEDICINE

## 2021-07-08 PROCEDURE — 76040000019: Performed by: INTERNAL MEDICINE

## 2021-07-08 PROCEDURE — 74011000250 HC RX REV CODE- 250: Performed by: ANESTHESIOLOGY

## 2021-07-08 PROCEDURE — 82962 GLUCOSE BLOOD TEST: CPT

## 2021-07-08 PROCEDURE — 77030019988 HC FCPS ENDOSC DISP BSC -B: Performed by: INTERNAL MEDICINE

## 2021-07-08 PROCEDURE — 84703 CHORIONIC GONADOTROPIN ASSAY: CPT

## 2021-07-08 PROCEDURE — 00731 ANES UPR GI NDSC PX NOS: CPT | Performed by: ANESTHESIOLOGY

## 2021-07-08 PROCEDURE — 74011250636 HC RX REV CODE- 250/636: Performed by: NURSE ANESTHETIST, CERTIFIED REGISTERED

## 2021-07-08 PROCEDURE — 88305 TISSUE EXAM BY PATHOLOGIST: CPT

## 2021-07-08 PROCEDURE — 74011000250 HC RX REV CODE- 250: Performed by: NURSE ANESTHETIST, CERTIFIED REGISTERED

## 2021-07-08 PROCEDURE — 77030008565 HC TBNG SUC IRR ERBE -B: Performed by: INTERNAL MEDICINE

## 2021-07-08 PROCEDURE — 00731 ANES UPR GI NDSC PX NOS: CPT | Performed by: NURSE ANESTHETIST, CERTIFIED REGISTERED

## 2021-07-08 PROCEDURE — 74011636637 HC RX REV CODE- 636/637: Performed by: NURSE ANESTHETIST, CERTIFIED REGISTERED

## 2021-07-08 PROCEDURE — 74011250636 HC RX REV CODE- 250/636: Performed by: ANESTHESIOLOGY

## 2021-07-08 RX ORDER — INSULIN LISPRO 100 [IU]/ML
INJECTION, SOLUTION INTRAVENOUS; SUBCUTANEOUS ONCE
Status: DISCONTINUED | OUTPATIENT
Start: 2021-07-08 | End: 2021-07-08 | Stop reason: HOSPADM

## 2021-07-08 RX ORDER — FAMOTIDINE 20 MG/1
20 TABLET, FILM COATED ORAL ONCE
Status: DISCONTINUED | OUTPATIENT
Start: 2021-07-08 | End: 2021-07-08

## 2021-07-08 RX ORDER — SODIUM CHLORIDE, SODIUM LACTATE, POTASSIUM CHLORIDE, CALCIUM CHLORIDE 600; 310; 30; 20 MG/100ML; MG/100ML; MG/100ML; MG/100ML
25 INJECTION, SOLUTION INTRAVENOUS CONTINUOUS
Status: DISCONTINUED | OUTPATIENT
Start: 2021-07-08 | End: 2021-07-08 | Stop reason: HOSPADM

## 2021-07-08 RX ORDER — SODIUM CHLORIDE 0.9 % (FLUSH) 0.9 %
5-40 SYRINGE (ML) INJECTION AS NEEDED
Status: DISCONTINUED | OUTPATIENT
Start: 2021-07-08 | End: 2021-07-08 | Stop reason: HOSPADM

## 2021-07-08 RX ORDER — LIDOCAINE HYDROCHLORIDE 10 MG/ML
0.1 INJECTION, SOLUTION EPIDURAL; INFILTRATION; INTRACAUDAL; PERINEURAL AS NEEDED
Status: DISCONTINUED | OUTPATIENT
Start: 2021-07-08 | End: 2021-07-08 | Stop reason: HOSPADM

## 2021-07-08 RX ORDER — FENTANYL CITRATE 50 UG/ML
50 INJECTION, SOLUTION INTRAMUSCULAR; INTRAVENOUS
Status: DISCONTINUED | OUTPATIENT
Start: 2021-07-08 | End: 2021-07-08 | Stop reason: HOSPADM

## 2021-07-08 RX ORDER — PROPOFOL 10 MG/ML
INJECTION, EMULSION INTRAVENOUS AS NEEDED
Status: DISCONTINUED | OUTPATIENT
Start: 2021-07-08 | End: 2021-07-08 | Stop reason: HOSPADM

## 2021-07-08 RX ORDER — DEXTROSE 50 % IN WATER (D50W) INTRAVENOUS SYRINGE
25-50 AS NEEDED
Status: DISCONTINUED | OUTPATIENT
Start: 2021-07-08 | End: 2021-07-08 | Stop reason: HOSPADM

## 2021-07-08 RX ORDER — OXYCODONE AND ACETAMINOPHEN 5; 325 MG/1; MG/1
1 TABLET ORAL AS NEEDED
Status: DISCONTINUED | OUTPATIENT
Start: 2021-07-08 | End: 2021-07-08 | Stop reason: HOSPADM

## 2021-07-08 RX ORDER — INSULIN LISPRO 100 [IU]/ML
INJECTION, SOLUTION INTRAVENOUS; SUBCUTANEOUS ONCE
Status: COMPLETED | OUTPATIENT
Start: 2021-07-08 | End: 2021-07-08

## 2021-07-08 RX ORDER — SODIUM CHLORIDE 0.9 % (FLUSH) 0.9 %
5-40 SYRINGE (ML) INJECTION EVERY 8 HOURS
Status: DISCONTINUED | OUTPATIENT
Start: 2021-07-08 | End: 2021-07-08 | Stop reason: HOSPADM

## 2021-07-08 RX ORDER — FENTANYL CITRATE 50 UG/ML
25 INJECTION, SOLUTION INTRAMUSCULAR; INTRAVENOUS AS NEEDED
Status: DISCONTINUED | OUTPATIENT
Start: 2021-07-08 | End: 2021-07-08 | Stop reason: HOSPADM

## 2021-07-08 RX ORDER — MAGNESIUM SULFATE 100 %
4 CRYSTALS MISCELLANEOUS AS NEEDED
Status: DISCONTINUED | OUTPATIENT
Start: 2021-07-08 | End: 2021-07-08 | Stop reason: HOSPADM

## 2021-07-08 RX ADMIN — PROPOFOL 20 MG: 10 INJECTION, EMULSION INTRAVENOUS at 09:33

## 2021-07-08 RX ADMIN — PROPOFOL 50 MG: 10 INJECTION, EMULSION INTRAVENOUS at 09:28

## 2021-07-08 RX ADMIN — PROPOFOL 40 MG: 10 INJECTION, EMULSION INTRAVENOUS at 09:30

## 2021-07-08 RX ADMIN — SODIUM CHLORIDE 10 ML: 9 INJECTION, SOLUTION INTRAMUSCULAR; INTRAVENOUS; SUBCUTANEOUS at 08:40

## 2021-07-08 RX ADMIN — INSULIN LISPRO 3 UNITS: 100 INJECTION, SOLUTION INTRAVENOUS; SUBCUTANEOUS at 08:55

## 2021-07-08 RX ADMIN — PROPOFOL 20 MG: 10 INJECTION, EMULSION INTRAVENOUS at 09:32

## 2021-07-08 RX ADMIN — SODIUM CHLORIDE, SODIUM LACTATE, POTASSIUM CHLORIDE, AND CALCIUM CHLORIDE 25 ML/HR: 600; 310; 30; 20 INJECTION, SOLUTION INTRAVENOUS at 08:40

## 2021-07-08 RX ADMIN — FAMOTIDINE 20 MG: 10 INJECTION INTRAVENOUS at 08:50

## 2021-07-08 RX ADMIN — PROPOFOL 20 MG: 10 INJECTION, EMULSION INTRAVENOUS at 09:31

## 2021-07-08 RX ADMIN — PROPOFOL 50 MG: 10 INJECTION, EMULSION INTRAVENOUS at 09:29

## 2021-07-08 NOTE — H&P
[email or web address]  [phone]      GASTROENTEROLOGY Pre-Procedure H and P      Impression/Plan:   1.  This patient is consented for an EGD for Dysphagia    Addendum: All lab tests orders pertaining to the procedure have been ordered by the anesthesia personnel and results will be addressed by the anesthesia team  Chief Complaint: Dysphagia    HPI:  Татьяна Sykes is a 55 y.o. female who is being is having an EGD for Dysphagia  PMH:   Past Medical History:   Diagnosis Date    Adverse effect of anesthesia 2014    per surgeon's Thyroid Lobectomy note: bronchospasm at the end of the case prior to extubation    Asthma     [facility]     Chronic diastolic heart failure of unknown etiology ([facility]     Per cardiolgy note    Essential hypertension 2017    GERD (gastroesophageal reflux disease)     Hypercholesteremia 2021    Palpitations 2017 rare but tody lasted 1 hr d/w pt- will wait for more testing    Sleep apnea     not using cpap, not tolerated    Thyroid disease     nodules    Type 2 diabetes mellitus without complication, without long-term current use of insulin ([facility] 2021       PSH:   Past Surgical History:   Procedure Laterality Date    HX PARTIAL THYROIDECTOMY      RT nodule with trach shift    SD CARDIAC SURG PROCEDURE UNLIST  2017    Cardiac Ablation       Social HX:   Social History     Socioeconomic History    Marital status:      Spouse name: Not on file    Number of children: Not on file    Years of education: Not on file    Highest education level: Not on file   Occupational History    Not on file   Tobacco Use    Smoking status: Former Smoker     Quit date: 1/10/1982     Years since quittin.5    Smokeless tobacco: Never Used   Vaping Use    Vaping Use: Never used   Substance and Sexual Activity    Alcohol use: Yes     Comment: rare wine glass <1/month    Drug use: Never    Sexual activity: Not on file   Other Topics Concern    Not on file   Social History Narrative    Not on file     Social Determinants of Health     Financial Resource Strain:     Difficulty of Paying Living Expenses:    Food Insecurity:     Worried About Running Out of Food in the Last Year:     [address] in the Last Year:    Transportation Needs:     Lack of Transportation (Medical):  Lack of Transportation (Non-Medical):    Physical Activity:     Days of Exercise per Week:     Minutes of Exercise per Session:    Stress:     Feeling of Stress :    Social Connections:     Frequency of Communication with Friends and Family:     Frequency of Social Gatherings with Friends and Family:     Attends Muslim Services:     Active Member of Clubs or Organizations:     Attends Club or Organization Meetings:     Marital Status:    Intimate Partner Violence:     Fear of Current or Ex-Partner:     Emotionally Abused:     Physically Abused:     Sexually Abused:        FHX:   Family History   Problem Relation Age of Onset    Heart Attack Father 48    Stroke Father     Heart Surgery Father        Allergy:   No Known Allergies    Home Medications:     Medications Prior to Admission   Medication Sig    albuterol (Ventolin HFA) 90 mcg/actuation inhaler Take  by inhalation.  SITagliptin-metFORMIN (Janumet) 50-1,000 mg per tablet Take 1 Tab by mouth two (2) times daily (with meals).  atorvastatin (LIPITOR) 40 mg tablet Take 1 Tab by mouth daily.  apixaban (ELIQUIS) 5 mg tablet Take 1 Tab by mouth two (2) times a day.  losartan (COZAAR) 50 mg tablet Take 1 Tab by mouth daily.  spironolactone (ALDACTONE) 25 mg tablet Take 1 Tab by mouth daily.  hydroCHLOROthiazide (HYDRODIURIL) 25 mg tablet Take 1 Tab by mouth every other day. Indications: high blood pressure    metoprolol tartrate (LOPRESSOR) 100 mg IR tablet Take 1 Tab by mouth two (2) times a day.  Indications: high blood pressure (Patient taking differently: Take 100 mg by mouth daily. Indications: high blood pressure)    traMADol (ULTRAM) 50 mg tablet Take 1 Tab by mouth three (3) times daily. Max Daily Amount: 150 mg. Indications: Pain (Patient taking differently: Take 50 mg by mouth every six (6) hours as needed. Indications: Pain)    cholecalciferol (VITAMIN D3) 1,000 unit cap Take  by mouth daily.  nitroglycerin (NITROSTAT) 0.4 mg SL tablet 1 Tab by SubLINGual route every five (5) minutes as needed for Chest Pain for up to 3 doses. Review of Systems:     Constitutional: No fevers, chills, weight loss, fatigue. Skin: No rashes, pruritis, jaundice, ulcerations, erythema. HENT: No headaches, nosebleeds, sinus pressure, rhinorrhea, sore throat. Eyes: No visual changes, blurred vision, eye pain, photophobia, jaundice. Cardiovascular: No chest pain, heart palpitations. Respiratory: No cough, SOB, wheezing, chest discomfort, orthopnea. Gastrointestinal:    Genitourinary: No dysuria, bleeding, discharge, pyuria. Musculoskeletal: No weakness, arthralgias, wasting. Endo: No sweats. Heme: No bruising, easy bleeding. Allergies: As noted. Neurological: Cranial nerves intact. Alert and oriented. Gait not assessed. Psychiatric:  No anxiety, depression, hallucinations. Visit Vitals  Ht 5' (1.524 m)   Wt 72.6 kg (160 lb)   LMP 05/24/2021 (Approximate)   BMI 31.25 kg/m²       Physical Assessment:     constitutional: appearance: well developed, well nourished, normal habitus, no deformities, in no acute distress. skin: inspection: no rashes, ulcers, icterus or other lesions; no clubbing or telangiectasias. palpation: no induration or subcutaneos nodules. eyes: inspection: normal conjunctivae and lids; no jaundice pupils: normal  ENMT: mouth: normal oral mucosa,lips and gums; good dentition. oropharynx: normal tongue, hard and soft palate; posterior pharynx without erithema, exudate or lesions.    neck: thyroid: normal size, consistency and position; no masses or tenderness. respiratory: effort: normal chest excursion; no intercostal retraction or accessory muscle use. cardiovascular: abdominal aorta: normal size and position; no bruits. palpation: PMI of normal size and position; normal rhythm; no thrill or murmurs. abdominal: abdomen: normal consistency; no tenderness or masses. hernias: no hernias appreciated. liver: normal size and consistency. spleen: not palpable. rectal: hemoccult/guaiac: not performed. musculoskeletal: digits and nails: no clubbing, cyanosis, petechiae or other inflammatory conditions. gait: normal gait and station head and neck: normal range of motion; no pain, crepitation or contracture. spine/ribs/pelvis: normal range of motion; no pain, deformity or contracture. neurologic: cranial nerves: II-XII normal.   psychiatric: judgement/insight: within normal limits. memory: within normal limits for recent and remote events. mood and affect: no evidence of depression, anxiety or agitation. orientation: oriented to time, space and person. Basic Metabolic Profile   No results for input(s): NA, K, CL, CO2, BUN, GLU, CA, MG, PHOS in the last 72 hours. No lab exists for component: CREAT      CBC w/Diff    No results for input(s): WBC, RBC, HGB, HCT, MCV, MCH, MCHC, RDW, PLT, HGBEXT, HCTEXT, PLTEXT in the last 72 hours. No lab exists for component: MPV No results for input(s): GRANS, LYMPH, EOS, PRO, MYELO, METAS, BLAST in the last 72 hours. No lab exists for component: MONO, BASO     Hepatic Function   No results for input(s): ALB, TP, TBILI, AP, AML, LPSE in the last 72 hours. No lab exists for component: DBILI, GPT, SGOT     Coags   No results for input(s): PTP, INR, APTT, INREXT in the last 72 hours. Drew Chavez MD  [facility] [address]  Cell: [phone]  Direct pager: [phone]  [email or web address]  [email or web address]

## 2021-07-08 NOTE — DISCHARGE INSTRUCTIONS
Upper GI Endoscopy: What to Expect at [address] [place] [address]  After you have an endoscopy, you will stay at the hospital or clinic for 1 to 2 hours. This will allow the medicine to wear off. You will be able to go home after your doctor or nurse checks to make sure you are not having any problems. You may have to stay overnight if you had treatment during the test. You may have a sore throat for a day or two after the test.  This care sheet gives you a general idea about what to expect after the test.  How can you care for yourself at home? Activity  · Rest as much as you need to after you go home. · You should be able to go back to your usual activities the day after the test.  Diet  · Follow your doctor's directions for eating after the test.  · Drink plenty of fluids (unless your doctor has told you not to). Medications  · If you have a sore throat the day after the test, use an over-the-counter spray to numb your throat. Follow-up care is a key part of your treatment and safety. Be sure to make and go to all appointments, and call your doctor if you are having problems. It's also a good idea to know your test results and keep a list of the medicines you take. When should you call for help? Call 911 anytime you think you may need emergency care. For example, call if:  · You passed out (lost consciousness). · You cough up blood. · You vomit blood or what looks like coffee grounds. · You pass maroon or very bloody stools. Call your doctor now or seek immediate medical care if:  · You have trouble swallowing. · You have belly pain. · Your stools are black and tarlike or have streaks of blood. · You are sick to your stomach or cannot keep fluids down. Watch closely for changes in your health, and be sure to contact your doctor if:  · Your throat still hurts after a day or two. · You do not get better as expected. Where can you learn more?    Go to [employer]  Enter J454 in the search box to learn more about \"Upper GI Endoscopy: What to Expect at Home. \"   © 0809-0311 Healthwise, Inc[employer]. Care instructions adapted under license by [employer] (which disclaims liability or warranty for this information). This care instruction is for use with your licensed healthcare professional. If you have questions about a medical condition or this instruction, always ask your healthcare professional. [address] any warranty or liability for your use of this information. Content Version: 80.4.643332; Current as of: November 14, 2014  Patient Education      Esophageal Dilation: What to Expect at [address] had an esophageal dilation. This procedure can open up narrow areas of the esophagus. After the procedure, you will stay at the hospital or surgery center for 1 to 2 hours. This will allow the medicine to wear off. You will be able to go home after your doctor or nurse checks to make sure that you're not having any problems. This care sheet gives you a general idea about how long it will take for you to recover. But each person recovers at a different pace. Follow the steps below to get better as quickly as possible. How can you care for yourself at home? Activity    · Rest as much as you need to after you go home.     · You should be able to go back to your usual activities the day after the procedure. Diet    · Follow your doctor's directions for eating after the procedure.     · Drink plenty of fluids (unless your doctor has told you not to). Medicines    · Your doctor will tell you if and when you can restart your medicines. He or she will also give you instructions about taking any new medicines.     · If you take aspirin or some other blood thinner, ask your doctor if and when to start taking it again.  Make sure that you understand exactly what your doctor wants you to do.     · If you have a sore throat the day after the procedure, use an over-the-counter spray to numb your throat. Sucking on throat lozenges and gargling with warm salt water may also help relieve your symptoms. Follow-up care is a key part of your treatment and safety. Be sure to make and go to all appointments, and call your doctor if you are having problems. It's also a good idea to know your test results and keep a list of the medicines you take. When should you call for help? Call 911 anytime you think you may need emergency care. For example, call if:    · You passed out (lost consciousness).     · You have trouble breathing.     · Your stools are maroon or very bloody   Call your doctor now or seek immediate medical care if:    · You have new or worse belly pain.     · You have a fever.     · You have new or more blood in your stools.     · You are sick to your stomach and cannot drink fluids.     · You cannot pass stools or gas.     · You have pain that does not get better after you take pain medicine. Watch closely for changes in your health, and be sure to contact your doctor if:    · Your throat still hurts after a day or two.     · You do not get better as expected. Where can you learn more? Go to http://www.gray.com/  Enter J014 in the search box to learn more about \"Esophageal Dilation: What to Expect at Home. \"  Current as of: April 15, 2020               Content Version: 12.8  © 9787-7330 Healthwise, [employer]. Care instructions adapted under license by [employer] (which disclaims liability or warranty for this information). If you have questions about a medical condition or this instruction, always ask your healthcare professional. [place] [address] any warranty or liability for your use of this information. DISCHARGE SUMMARY from Nurse     POST-PROCEDURE INSTRUCTIONS:    Call your Physician if you:  ? Observe any excess bleeding. ? Develop a temperature over 100.5o F.  ?  Experience abdominal, shoulder or chest pain. ? Notice any signs of decreased circulation or nerve impairment to an extremity such as a change in color, persistent numbness, tingling, coldness or increase in pain. ? Vomit blood or you have nausea and vomiting lasting longer than 4 hours. ? Are unable to take medications. ? Are unable to urinate within 8 hours after discharge following general anesthesia or intravenous sedation. For the next 24 hours after receiving general anesthesia or intravenous sedation, or while taking prescription Narcotics, limit your activities:  ? Do NOT drive a motor vehicle, operate hazard machinery or power tools, or perform tasks that require coordination. The medication you received during your procedure may have some effect on your mental awareness. ? Do NOT make important personal or business decisions. The medication you received during your procedure may have some effect on your mental awareness. ? Do NOT drink alcoholic beverages. These drinks do not mix well with the medications that have been given to you. ? Upon discharge from the hospital, you must be accompanied by a responsible adult. ? Resume your diet as directed by your physician. ? Resume medications as your physician has prescribed. ? Please give a list of your current medications to your Primary Care Provider. ? Please update this list whenever your medications are discontinued, doses are changed, or new medications (including over-the-counter products) are added. ? Please carry medication information at all times in case of emergency situations. These are general instructions for a healthy lifestyle:    No smoking/ No tobacco products/ Avoid exposure to second hand smoke.  Surgeon General's Warning:  Quitting smoking now greatly reduces serious risk to your health. Obesity, smoking, and a sedentary lifestyle greatly increase your risk for illness.    A healthy diet, regular physical exercise & weight monitoring are important for maintaining a healthy lifestyle   You may be retaining fluid if you have a history of heart failure or if you experience any of the following symptoms:  Weight gain of 3 pounds or more overnight or 5 pounds in a week, increased swelling in our hands or feet or shortness of breath while lying flat in bed. Please call your doctor as soon as you notice any of these symptoms; do not wait until your next office visit. Recognize signs and symptoms of STROKE:  F  -  Face looks uneven  A  -  Arms unable to move or move unevenly  S  -  Speech slurred or non-existent  T  -  Time to call 911 - as soon as signs and symptoms begin - DO NOT go back to bed or wait to see If you get better - TIME IS BRAIN. Colorectal Screening   Colorectal cancer almost always develops from precancerous polyps (abnormal growths) in the colon or rectum. Screening tests can find precancerous polyps, so that they can be removed before they turn into cancer. Screening tests can also find colorectal cancer early, when treatment works best.  [facility] Speak with your physician about when you should begin screening and how often you should be tested     Additional Information    If you have questions, please call [phone]. Remember, [employer] is NOT to be used for urgent needs. For medical emergencies, dial 911. Educational references and/or instructions provided during this visit included:    See attached. APPOINTMENTS:    Per MD instruction    Discharge information has been reviewed with the patient. The patient verbalized understanding.

## 2021-07-08 NOTE — ANESTHESIA PREPROCEDURE EVALUATION
Relevant Problems   RESPIRATORY SYSTEM   (+) VEL (obstructive sleep apnea)      CARDIOVASCULAR   (+) Chronic diastolic congestive heart failure (HCC)   (+) Essential hypertension   (+) Paroxysmal atrial fibrillation (HCC)      ENDOCRINE   (+) Type 2 diabetes mellitus without complication, without long-term current use of insulin (HCC)       Anesthetic History   No history of anesthetic complications            Review of Systems / Medical History  Patient summary reviewed and pertinent labs reviewed    Pulmonary        Sleep apnea: No treatment    Asthma : well controlled       Neuro/Psych   Within defined limits           Cardiovascular    Hypertension        Dysrhythmias            GI/Hepatic/Renal     GERD: well controlled           Endo/Other    Diabetes: type 2  Hypothyroidism: well controlled       Other Findings              Physical Exam    Airway  Mallampati: II  TM Distance: 4 - 6 cm  Neck ROM: normal range of motion   Mouth opening: Normal     Cardiovascular               Dental  No notable dental hx       Pulmonary                 Abdominal  GI exam deferred       Other Findings            Anesthetic Plan    ASA: 3  Anesthesia type: MAC            Anesthetic plan and risks discussed with: Patient

## 2021-07-08 NOTE — ANESTHESIA POSTPROCEDURE EVALUATION
Procedure(s):  UPPER ENDOSCOPY with Bx and Dilation. MAC    Anesthesia Post Evaluation      Multimodal analgesia: multimodal analgesia not used between 6 hours prior to anesthesia start to PACU discharge  Patient location during evaluation: PACU  Patient participation: complete - patient participated  Level of consciousness: awake  Pain score: 0  Pain management: adequate  Airway patency: patent  Anesthetic complications: no  Cardiovascular status: acceptable  Respiratory status: acceptable  Hydration status: acceptable  Post anesthesia nausea and vomiting:  none  Final Post Anesthesia Temperature Assessment:  Normothermia (36.0-37.5 degrees C)      INITIAL Post-op Vital signs:   Vitals Value Taken Time   /70 07/08/21 0937   Temp 36.7 °C (98 °F) 07/08/21 0937   Pulse 103 07/08/21 0939   Resp 35 07/08/21 0939   SpO2 98 % 07/08/21 0939   Vitals shown include unvalidated device data.

## 2021-07-08 NOTE — PROGRESS NOTES
[email or web address] [address]  [address] [place], [address]      Brief Procedure Note    Linda Morrow  1965  [id number]    Date of Procedure: 7/8/2021    Preoperative diagnosis: GERD    Postoperative diagnosis: Hiatal hernia Esophageal Bx and Dilation 52    Type of Anesthesia: MAC (Monitored anesthesia care)    Description of findings: same as post op dx    Procedure: Procedure(s):  UPPER ENDOSCOPY with Bx and Dilation    :  Dr. Nirali Maxwell MD    Assistant(s): Endoscopy Technician-1: Wendy Garcia  Endoscopy RN-1: Arturo Gonsales RN; Omayra Bai RN    Devices/implants/grafts/tissues/prosthesis: None    EBL:None    Specimens:   ID Type Source Tests Collected by Time Destination   1 : Esophagus bx Preservative Esophagus  Loretta Cosby MD 7/8/2021 0930 Pathology       Findings: See printed and scanned procedure note    Complications: None    Dr. Nirali Maxwell MD  7/8/2021  9:48 AM

## 2021-08-06 ENCOUNTER — OFFICE VISIT (OUTPATIENT)
Dept: CARDIOLOGY CLINIC | Age: 56
End: 2021-08-06
Payer: MEDICAID

## 2021-08-06 VITALS
HEIGHT: 60 IN | WEIGHT: 160 LBS | HEART RATE: 102 BPM | BODY MASS INDEX: 31.41 KG/M2 | SYSTOLIC BLOOD PRESSURE: 148 MMHG | DIASTOLIC BLOOD PRESSURE: 84 MMHG

## 2021-08-06 DIAGNOSIS — Z98.890 S/P ABLATION OF ATRIAL FIBRILLATION: ICD-10-CM

## 2021-08-06 DIAGNOSIS — E11.9 TYPE 2 DIABETES MELLITUS WITHOUT COMPLICATION, WITHOUT LONG-TERM CURRENT USE OF INSULIN (HCC): ICD-10-CM

## 2021-08-06 DIAGNOSIS — G47.33 OSA (OBSTRUCTIVE SLEEP APNEA): ICD-10-CM

## 2021-08-06 DIAGNOSIS — I48.0 PAROXYSMAL ATRIAL FIBRILLATION (HCC): Primary | ICD-10-CM

## 2021-08-06 DIAGNOSIS — E66.9 OBESITY (BMI 30.0-34.9): ICD-10-CM

## 2021-08-06 DIAGNOSIS — R00.2 PALPITATIONS: ICD-10-CM

## 2021-08-06 DIAGNOSIS — Z86.79 S/P ABLATION OF ATRIAL FIBRILLATION: ICD-10-CM

## 2021-08-06 DIAGNOSIS — I10 ESSENTIAL HYPERTENSION: ICD-10-CM

## 2021-08-06 PROCEDURE — 99214 OFFICE O/P EST MOD 30 MIN: CPT | Performed by: INTERNAL MEDICINE

## 2021-08-06 PROCEDURE — 93000 ELECTROCARDIOGRAM COMPLETE: CPT | Performed by: INTERNAL MEDICINE

## 2021-08-06 RX ORDER — METOPROLOL TARTRATE 50 MG/1
50 TABLET ORAL 2 TIMES DAILY
Qty: 180 TABLET | Refills: 1 | Status: SHIPPED | OUTPATIENT
Start: 2021-08-06 | End: 2022-04-01 | Stop reason: SDUPTHER

## 2021-08-06 RX ORDER — AMLODIPINE BESYLATE 5 MG/1
5 TABLET ORAL DAILY
COMMUNITY

## 2021-08-06 RX ORDER — LOSARTAN POTASSIUM 50 MG/1
50 TABLET ORAL DAILY
Qty: 90 TABLET | Refills: 3 | Status: SHIPPED | OUTPATIENT
Start: 2021-08-06

## 2021-08-06 RX ORDER — CANAGLIFLOZIN 100 MG/1
300 TABLET, FILM COATED ORAL
COMMUNITY
Start: 2021-06-26

## 2021-08-06 RX ORDER — OMEPRAZOLE 20 MG/1
20 TABLET, DELAYED RELEASE ORAL AS NEEDED
COMMUNITY

## 2021-08-06 NOTE — PROGRESS NOTES
HISTORY OF PRESENT ILLNESS  Aníbal Hood is a 55 y.o. female. Palpitations   The history is provided by the patient. This is a new problem. The current episode started more than 1 week ago (yrs). The problem has been resolved (PAF). The problem occurs every several days (2/wk). On average, each episode lasts 1 hour. The problem is associated with nothing. Associated symptoms include lower extremity edema. Pertinent negatives include no diaphoresis, no fever, no malaise/fatigue, no chest pain, no claudication, no orthopnea, no PND, no nausea, no vomiting, no headaches, no dizziness, no cough and no shortness of breath. Her past medical history is significant for hypertension. CHF  The history is provided by the medical records. This is a chronic problem. Pertinent negatives include no chest pain, no headaches and no shortness of breath. Hypertension  The history is provided by the medical records. This is a chronic problem. Pertinent negatives include no chest pain, no headaches and no shortness of breath. Review of Systems   Constitutional: Negative for chills, diaphoresis, fever, malaise/fatigue and weight loss. HENT: Negative for nosebleeds. Eyes: Negative for discharge. Respiratory: Negative for cough, shortness of breath and wheezing. Cardiovascular: Positive for palpitations. Negative for chest pain, orthopnea, claudication, leg swelling and PND. Gastrointestinal: Negative for diarrhea, nausea and vomiting. Genitourinary: Negative for dysuria and hematuria. Musculoskeletal: Negative for joint pain. Skin: Negative for rash. Neurological: Negative for dizziness, seizures, loss of consciousness and headaches. Endo/Heme/Allergies: Negative for polydipsia. Does not bruise/bleed easily. Psychiatric/Behavioral: Negative for depression and substance abuse. The patient does not have insomnia.       No Known Allergies    Past Medical History:   Diagnosis Date    Adverse effect of anesthesia 2014    per surgeon's Thyroid Lobectomy note: bronchospasm at the end of the case prior to extubation    Asthma     Atrial fibrillation ([facility] [address]     Chronic diastolic heart failure of unknown etiology ([facility] [address]     Per cardiolgy note    Essential hypertension 2017    GERD (gastroesophageal reflux disease)     Hypercholesteremia 2021    Palpitations 2017 rare but tody lasted 1 hr d/w pt- will wait for more testing    Sleep apnea     not using cpap, not tolerated    Thyroid disease     nodules    Type 2 diabetes mellitus without complication, without long-term current use of insulin ([facility] [address] 2021       Family History   Problem Relation Age of Onset    Heart Attack Father 48    Stroke Father     Heart Surgery Father        Social History     Tobacco Use    Smoking status: Former Smoker     Quit date: 1/10/1982     Years since quittin.5    Smokeless tobacco: Never Used   Vaping Use    Vaping Use: Never used   Substance Use Topics    Alcohol use: Yes     Comment: rare wine glass <1/month    Drug use: Never        Current Outpatient Medications   Medication Sig    Invokana 100 mg tablet TAKE 1 TABLET BY MOUTH EVERY DAY    amLODIPine (NORVASC) 5 mg tablet Take 5 mg by mouth daily.  omeprazole (PriLOSEC OTC) 20 mg tablet Take 20 mg by mouth as needed.  albuterol (Ventolin HFA) 90 mcg/actuation inhaler Take  by inhalation.  SITagliptin-metFORMIN (Janumet) 50-1,000 mg per tablet Take 1 Tab by mouth two (2) times daily (with meals).  atorvastatin (LIPITOR) 40 mg tablet Take 1 Tab by mouth daily. (Patient taking differently: Take 20 mg by mouth daily.)    apixaban (ELIQUIS) 5 mg tablet Take 1 Tab by mouth two (2) times a day.  spironolactone (ALDACTONE) 25 mg tablet Take 1 Tab by mouth daily.  hydroCHLOROthiazide (HYDRODIURIL) 25 mg tablet Take 1 Tab by mouth every other day.  Indications: high blood pressure    metoprolol tartrate (LOPRESSOR) 100 mg IR tablet Take 1 Tab by mouth two (2) times a day. Indications: high blood pressure (Patient taking differently: Take 25 mg by mouth two (2) times a day. Indications: high blood pressure)    traMADol (ULTRAM) 50 mg tablet Take 1 Tab by mouth three (3) times daily. Max Daily Amount: 150 mg. Indications: Pain (Patient taking differently: Take 50 mg by mouth every six (6) hours as needed. Indications: Pain)    cholecalciferol (VITAMIN D3) 1,000 unit cap Take  by mouth daily.  nitroglycerin (NITROSTAT) 0.4 mg SL tablet 1 Tab by SubLINGual route every five (5) minutes as needed for Chest Pain for up to 3 doses. No current facility-administered medications for this visit. Past Surgical History:   Procedure Laterality Date    HX PARTIAL THYROIDECTOMY  2014    RT nodule with trach shift    GA CARDIAC SURG PROCEDURE UNLIST  2017    Cardiac Ablation       Diagnostic Studies:  I have reviewed the relevant tests done on the patient and show as follows  EKG tracings reviewed by me today. CARDIOLOGY STUDIES 8/8/2017   Event Monitor Result 2 wks- STach with activation and baseline     6/17 CTA Heart/Lungs/PV  The coronary arteries have normal origins and courses. There are no   distinct coronary calcifications identified, though this study was not   optimized for coronary artery evaluation. The limited images of the upper abdomen are unremarkable. IMPRESSION:   1. Normal pulmonary venous anatomy without pulmonary vein stenosis. 2. Mild biatrial enlargement. No left atrial or left atrial appendage   thrombus. 5/17 ECHO  ECHO CARDIOGRAM [address]  Component Name Value Ref Range   EF Echo 65     Result Impression   :   DECREASED LEFT VENTRICULAR CAVITY SIZE WITH LEFT VENTRICULAR HYPERTROPHY PRESENT. NORMAL GLOBAL LEFT VENTRICULAR SYSTOLIC FUNCTION WITH AN ESTIMATED EJECTION FRACTION OF   65%. MILD DIASTOLIC DYSFUNCTION. MITRAL ANNULAR CALCIFICATION. SCLEROTIC TRILEAFLET AORTIC VALVE WITHOUT EVIDENCE OF STENOSIS. TRACE OF TRICUSPID REGURGITATION WITH A NORMAL PULMONARY ARTERY PRESSURE   STRUCTURALLY NORMAL PULMONIC VALVE. NO EVIDENCE OF PERICARDIAL EFFUSION. NO MASSES, SHUNTS OR THROMBI SEEN. NO PREVIOUS REPORT FOR COMPARISON. 6/17 PVL  Conclusions: Hemodynamics in the right common femoral vein are consistent with more proximal venous obstruction. No evidence of deep venous thrombosis in the right  femoral, deep femoral, popliteal, posterior tibial and peroneal veins. Technically compromised study therefore non-occlusive deep venous thrombosis cannot be excluded in the right common femoral vein as described in the findings.    Reflux evaluation is deferred. 1/17 Nuc Stress  Conclusion:   1. Normal perfusion scan. 2. Normal wall motion and ejection fraction. 3. Low risk scan. Visit Vitals  BP (!) 148/84   Pulse (!) 102   Ht 5' (1.524 m)   Wt 72.6 kg (160 lb)   BMI 31.25 kg/m²       Ms. Cody Ramires has a reminder for a \"due or due soon\" health maintenance. I have asked that she contact her primary care provider for follow-up on this health maintenance. Physical Exam  Vitals and nursing note reviewed. Constitutional:       General: She is not in acute distress. Appearance: She is well-developed. Comments: obese   HENT:      Head: Normocephalic and atraumatic. Mouth/Throat:      Dentition: Normal dentition. Eyes:      General: No scleral icterus. Right eye: No discharge. Left eye: No discharge. Neck:      Thyroid: No thyromegaly. Vascular: No carotid bruit or JVD. Cardiovascular:      Rate and Rhythm: Normal rate and regular rhythm. Pulses: Intact distal pulses. Heart sounds: Normal heart sounds, S1 normal and S2 normal. No murmur heard. No friction rub. No gallop. Pulmonary:      Effort: Pulmonary effort is normal.      Breath sounds: Normal breath sounds. No wheezing or rales. Chest:      Chest wall: No tenderness. Abdominal:      Palpations: Abdomen is soft. There is no mass. Tenderness: There is no abdominal tenderness. Musculoskeletal:      Cervical back: Neck supple. Right lower leg: No edema. Left lower leg: No edema. Lymphadenopathy:      Cervical:      Right cervical: No superficial cervical adenopathy. Left cervical: No superficial cervical adenopathy. Skin:     General: Skin is warm and dry. Findings: No rash. Neurological:      Mental Status: She is alert and oriented to person, place, and time. Psychiatric:         Behavior: Behavior normal.         ASSESSMENT and PLAN      Patient is fairly compliant with medications but blood pressure is elevated. Increase metoprolol and follow the home chart. Diet weight and exercise discussed. HLD : LIPID COMPLETE PANELResulted: 11/20/2020 4:42 PM  [address]  Component Name Value Ref Range   Cholesterol 210 (H) 110 - 200 mg/dL   Triglyceride 122 40 - 149 mg/dL   HDL 46 >=40 mg/dL   Cholesterol/HDL 4.6 0.0 - 5.0    Non-HDL Cholesterol 164 (H) <130 mg/dL   LDL CALCULATION 140 (H) 50 - 99 mg/dL   VLDL CALCULATION 24 8 - 30 mg/dL   LDL/HDL Ratio 3.1     Results for Veronica Renteria (MRN [id number]) as of 8/6/2021 08:37   Ref. Range 10/21/2019 00:00 3/23/2021 00:00   Triglyceride Latest Ref Range: 0 - 149 mg/dL 86 56   Cholesterol, total Latest Ref Range: 100 - 199 mg/dL 151 104   HDL Cholesterol Latest Ref Range: >39 mg/dL 43 37 (L)   VLDL, calculated Latest Ref Range: 5 - 40 mg/dL 17 13   LDL, calculated Latest Ref Range: 0 - 99 mg/dL 91 54       10/2019  History of PAF s/p ablation. She has had no reoccurrence of Atrial fibrillation. She was previously on Coumadin, then Eliquis for DVT, but was stopped approximately 1 year ago by surgeon. [id number] = 5  Blood pressure is elevated in office today. She reports is typically 187Z systolic at home. She also a newly diagnosed diabetic.   Will add Losartan 50 mg, and follow home chart. She is to have labs drawn today (CBC, TSH, CMP, and lipids). Diet and exercise encouraged to promote weight loss. Atrial Fibrillation CHADSVASC2 Score Stroke Risk:   55 y.o. <65        + 0    female Female +1   CHF HX: Yes    +1   HTN HX: Yes    +1   Stroke/TIA/Thromboembolism No    +0   Vascular Disease HX: No    + 0   Diabetes Mellitus Yes    +1   CHADSVASC 2 Score 4      Annual Stroke Risk 4.8%- moderate-high          LLE DVT in 2017 2/21 A. fib is staying in sinus rhythm. Will continue Eliquis. Blood pressure and heart rate are elevated. Patient taking metoprolol only once a day. She will start taking it twice a day. Follow home chart. She is losing weight gradually. Unfortunately not using CPAP. Diabetes is not controlled. Long discussion about diet. Mediterranean diet guidelines printed. Lipids are not controlled. Increase Lipitor and follow-up the labs. Periodic follow-up of electrolytes due to Aldactone use. Diagnoses and all orders for this visit:    1. Paroxysmal atrial fibrillation (HCC)  -     AMB POC EKG ROUTINE W/ 12 LEADS, INTER & REP    2. Essential hypertension  -     losartan (COZAAR) 50 mg tablet; Take 1 Tablet by mouth daily.  -     METABOLIC PANEL, BASIC; Future    3. S/P ablation of atrial fibrillation    4. Obesity (BMI 30.0-34.9)    5. VEL (obstructive sleep apnea)    6. Type 2 diabetes mellitus without complication, without long-term current use of insulin (HCC)    7. Palpitations  Comments:  9/17 STach, incr metoprolol; reduce HCTZ  Orders:  -     metoprolol tartrate (LOPRESSOR) 50 mg tablet; Take 1 Tablet by mouth two (2) times a day. Indications: high blood pressure        Pertinent laboratory and test data reviewed and discussed with patient.   See patient instructions also for other medical advice given    Medications Discontinued During This Encounter   Medication Reason    losartan (COZAAR) 50 mg tablet Not A Current Medication    metoprolol tartrate (LOPRESSOR) 100 mg IR tablet        Follow-up and Dispositions    · Return in about 6 months (around 2/6/2022), or if symptoms worsen or fail to improve, for with ekg, BP log x 4-5 days post med changes. 8/6/2021 blood pressure is mildly elevated. Some home medications were reduced and as patient says, by PCP. I think losartan will be good given the fact that she has diabetes. Her heart rate is top normal stop metoprolol need for better higher dose. If her blood pressure falls significantly, would recommend that she reduce Norvasc. Increase metoprolol and restarted losartan. Follow the home chart. Follow electrolytes due to Aldactone. Diet weight and exercise discussed. Mediterranean diet guidelines printed. She is staying in sinus rhythm confirmed by EKG today.   CHF is nearly resolved NYHA class I.

## 2021-08-06 NOTE — PROGRESS NOTES
1. Have you been to the ER, urgent care clinic since your last visit? Hospitalized since your last visit? Yes When:7/2021Where: [facility] Reason for visit: throat    2. Have you seen or consulted any other health care providers outside of the [address] [place] [address] since your last visit? Include any pap smears or colon screening. No     3. Since your last visit, have you had any of the following symptoms? no         4. Have you had any blood work, X-rays or cardiac testing? No         5. Where do you normally have your labs drawn? yarely    6. Do you need any refills today?    no

## 2021-08-06 NOTE — PATIENT INSTRUCTIONS
Medications Discontinued During This Encounter   Medication Reason    losartan (COZAAR) 50 mg tablet Not A Current Medication    metoprolol tartrate (LOPRESSOR) 100 mg IR tablet      After the recommended changes have been made in blood pressure medicines, patient advised to keep BP/HR(pulse rate) chart twice daily and bring us results in next 4 to 5 days. Patient may send the results via \"My Chart\" if desired. Please rest for 5-10 minutes before checking blood pressure. Sit on a comfortable chair without crossing the legs and put your arm on a table. We recommend that you use an upper arm cuff. Check the blood pressure 3 times each time you check the blood pressure and record the lowest reading. If you check the blood pressure in both arms, use the higher reading. Learning About the [address] Diet  What is the Mediterranean diet? The Mediterranean diet is a style of eating rather than a diet plan. It features foods eaten in [place], [place], [place] and [place], and other countries along the [facility]. It emphasizes eating foods like fish, fruits, vegetables, beans, high-fiber breads and whole grains, nuts, and olive oil. This style of eating includes limited red meat, cheese, and sweets. Why choose the Mediterranean diet? A Mediterranean-style diet may improve heart health. It contains more fat than other heart-healthy diets. But the fats are mainly from nuts, unsaturated oils (such as fish oils and olive oil), and certain nut or seed oils (such as canola, soybean, or flaxseed oil). These fats may help protect the heart and blood vessels. How can you get started on the Mediterranean diet? Here are some things you can do to switch to a more Mediterranean way of eating. What to eat  · Eat a variety of fruits and vegetables each day, such as grapes, blueberries, tomatoes, broccoli, peppers, figs, olives, spinach, eggplant, beans, lentils, and chickpeas.   · Eat a variety of whole-grain foods each day, such as oats, brown rice, and whole wheat bread, pasta, and couscous. · Eat fish at least 2 times a week. Try tuna, salmon, mackerel, lake trout, herring, or sardines. · Eat moderate amounts of low-fat dairy products, such as milk, cheese, or yogurt. · Eat moderate amounts of poultry and eggs. · Choose healthy (unsaturated) fats, such as nuts, olive oil, and certain nut or seed oils like canola, soybean, and flaxseed. · Limit unhealthy (saturated) fats, such as butter, palm oil, and coconut oil. And limit fats found in animal products, such as meat and dairy products made with whole milk. Try to eat red meat only a few times a month in very small amounts. · Limit sweets and desserts to only a few times a week. This includes sugar-sweetened drinks like soda. The Mediterranean diet may also include red wine with your meal1 glass each day for women and up to 2 glasses a day for men. Tips for eating at home  · Use herbs, spices, garlic, lemon zest, and citrus juice instead of salt to add flavor to foods. · Add avocado slices to your sandwich instead of conteh. · Have fish for lunch or dinner instead of red meat. Brush the fish with olive oil, and broil or grill it. · Sprinkle your salad with seeds or nuts instead of cheese. · Cook with olive or canola oil instead of butter or oils that are high in saturated fat. · Switch from 2% milk or whole milk to 1% or fat-free milk. · Dip raw vegetables in a vinaigrette dressing or hummus instead of dips made from mayonnaise or sour cream.  · Have a piece of fruit for dessert instead of a piece of cake. Try baked apples, or have some dried fruit. Tips for eating out  · Try broiled, grilled, baked, or poached fish instead of having it fried or breaded. · Ask your  to have your meals prepared with olive oil instead of butter. · Order dishes made with marinara sauce or sauces made from olive oil.  Avoid sauces made from cream or mayonnaise. · Choose whole-grain breads, whole wheat pasta and pizza crust, brown rice, beans, and lentils. · Cut back on butter or margarine on bread. Instead, you can dip your bread in a small amount of olive oil. · Ask for a side salad or grilled vegetables instead of french fries or chips. Where can you learn more? Go to http://www.tilley.com/  Enter O407 in the search box to learn more about \"Learning About the Mediterranean Diet. \"  Current as of: December 17, 2020               Content Version: 12.8  © 7542-1587 [employer]. Care instructions adapted under license by [employer] (which disclaims liability or warranty for this information). If you have questions about a medical condition or this instruction, always ask your healthcare professional. [address] any warranty or liability for your use of this information.

## 2022-03-18 PROBLEM — R00.2 PALPITATIONS: Status: ACTIVE | Noted: 2017-01-27

## 2022-03-18 PROBLEM — Z82.49 FH: CAD (CORONARY ARTERY DISEASE): Status: ACTIVE | Noted: 2017-01-10

## 2022-03-18 PROBLEM — I48.0 PAROXYSMAL ATRIAL FIBRILLATION (HCC): Status: ACTIVE | Noted: 2017-08-08

## 2022-03-18 PROBLEM — E78.00 HYPERCHOLESTEREMIA: Status: ACTIVE | Noted: 2021-02-05

## 2022-03-19 PROBLEM — E11.9 TYPE 2 DIABETES MELLITUS WITHOUT COMPLICATION, WITHOUT LONG-TERM CURRENT USE OF INSULIN (HCC): Status: ACTIVE | Noted: 2021-02-05

## 2022-03-19 PROBLEM — E66.9 OBESITY (BMI 30.0-34.9): Status: ACTIVE | Noted: 2017-01-10

## 2022-03-19 PROBLEM — G47.33 OSA (OBSTRUCTIVE SLEEP APNEA): Status: ACTIVE | Noted: 2021-02-05

## 2022-03-19 PROBLEM — R07.9 CHEST PAIN: Status: ACTIVE | Noted: 2017-01-10

## 2022-03-19 PROBLEM — E66.811 OBESITY (BMI 30.0-34.9): Status: ACTIVE | Noted: 2017-01-10

## 2022-03-19 PROBLEM — Z98.890 S/P ABLATION OF ATRIAL FIBRILLATION: Status: ACTIVE | Noted: 2017-08-08

## 2022-03-19 PROBLEM — R03.0 PREHYPERTENSION: Status: ACTIVE | Noted: 2017-01-10

## 2022-03-19 PROBLEM — Z86.79 S/P ABLATION OF ATRIAL FIBRILLATION: Status: ACTIVE | Noted: 2017-08-08

## 2022-03-19 PROBLEM — I10 ESSENTIAL HYPERTENSION: Status: ACTIVE | Noted: 2017-08-08

## 2022-03-20 PROBLEM — I50.32 CHRONIC DIASTOLIC CONGESTIVE HEART FAILURE (HCC): Status: ACTIVE | Noted: 2019-04-19

## 2022-03-20 PROBLEM — R94.31 ABNORMAL EKG: Status: ACTIVE | Noted: 2017-01-10

## 2022-04-01 ENCOUNTER — OFFICE VISIT (OUTPATIENT)
Dept: CARDIOLOGY CLINIC | Age: 57
End: 2022-04-01
Payer: MEDICAID

## 2022-04-01 VITALS
SYSTOLIC BLOOD PRESSURE: 112 MMHG | HEIGHT: 60 IN | OXYGEN SATURATION: 98 % | HEART RATE: 81 BPM | BODY MASS INDEX: 30.82 KG/M2 | DIASTOLIC BLOOD PRESSURE: 68 MMHG | WEIGHT: 157 LBS

## 2022-04-01 DIAGNOSIS — E66.9 OBESITY (BMI 30.0-34.9): ICD-10-CM

## 2022-04-01 DIAGNOSIS — R00.2 PALPITATIONS: ICD-10-CM

## 2022-04-01 DIAGNOSIS — E78.00 HYPERCHOLESTEREMIA: ICD-10-CM

## 2022-04-01 DIAGNOSIS — I48.0 PAROXYSMAL ATRIAL FIBRILLATION (HCC): ICD-10-CM

## 2022-04-01 DIAGNOSIS — I10 ESSENTIAL HYPERTENSION: Primary | ICD-10-CM

## 2022-04-01 PROCEDURE — 99214 OFFICE O/P EST MOD 30 MIN: CPT | Performed by: INTERNAL MEDICINE

## 2022-04-01 RX ORDER — ATORVASTATIN CALCIUM 40 MG/1
40 TABLET, FILM COATED ORAL DAILY
Qty: 90 TABLET | Refills: 1 | Status: SHIPPED | OUTPATIENT
Start: 2022-04-01

## 2022-04-01 RX ORDER — METOPROLOL TARTRATE 50 MG/1
50 TABLET ORAL 2 TIMES DAILY
Qty: 180 TABLET | Refills: 1 | Status: SHIPPED | OUTPATIENT
Start: 2022-04-01 | End: 2022-10-14

## 2022-04-01 NOTE — PROGRESS NOTES
HISTORY OF PRESENT ILLNESS  Griffin Luis is a 65 y.o. female. Follow-up of atrial fibrillation, hypertension, status post A. fib ablation, obesity,  History of diabetes, VEL    Palpitations   The history is provided by the patient. This is a new problem. The current episode started more than 1 week ago (yrs). The problem has been resolved (PAF). The problem occurs every several days (2/wk). On average, each episode lasts 1 hour. The problem is associated with nothing. Associated symptoms include lower extremity edema. Pertinent negatives include no diaphoresis, no fever, no malaise/fatigue, no chest pain, no claudication, no orthopnea, no PND, no nausea, no vomiting, no headaches, no dizziness, no cough and no shortness of breath. Her past medical history is significant for hypertension. Follow-up  Pertinent negatives include no chest pain, no headaches and no shortness of breath. Review of Systems   Constitutional: Negative for chills, diaphoresis, fever, malaise/fatigue and weight loss. HENT: Negative for nosebleeds. Eyes: Negative for discharge. Respiratory: Negative for cough, shortness of breath and wheezing. Cardiovascular: Positive for palpitations (Status post A. fib ablation). Negative for chest pain, orthopnea, claudication, leg swelling and PND. Gastrointestinal: Negative for diarrhea, nausea and vomiting. Genitourinary: Negative for dysuria and hematuria. Musculoskeletal: Negative for joint pain. Skin: Negative for rash. Neurological: Negative for dizziness, seizures, loss of consciousness and headaches. Endo/Heme/Allergies: Negative for polydipsia. Does not bruise/bleed easily. Psychiatric/Behavioral: Negative for depression and substance abuse. The patient does not have insomnia.       No Known Allergies    Past Medical History:   Diagnosis Date    Adverse effect of anesthesia 11/03/2014    per surgeon's Thyroid Lobectomy note: bronchospasm at the end of the case prior to extubation    Asthma     [facility]     Chronic diastolic heart failure of unknown etiology ([facility]     Per cardiolgy note    Essential hypertension 2017    GERD (gastroesophageal reflux disease)     Hypercholesteremia 2021    Palpitations 2017 rare but tody lasted 1 hr d/w pt- will wait for more testing    Sleep apnea     not using cpap, not tolerated    Thyroid disease     nodules    Type 2 diabetes mellitus without complication, without long-term current use of insulin ([facility] 2021       Family History   Problem Relation Age of Onset    Heart Attack Father 48    Stroke Father     Heart Surgery Father        Social History     Tobacco Use    Smoking status: Former Smoker     Quit date: 1/10/1982     Years since quittin.2    Smokeless tobacco: Never Used   Vaping Use    Vaping Use: Never used   Substance Use Topics    Alcohol use: Yes     Comment: rare wine glass <1/month not drinking    Drug use: Never        Current Outpatient Medications   Medication Sig    Invokana 100 mg tablet 300 mg.    amLODIPine (NORVASC) 5 mg tablet Take 5 mg by mouth daily.  omeprazole (PriLOSEC OTC) 20 mg tablet Take 20 mg by mouth as needed.  metoprolol tartrate (LOPRESSOR) 50 mg tablet Take 1 Tablet by mouth two (2) times a day. Indications: high blood pressure    losartan (COZAAR) 50 mg tablet Take 1 Tablet by mouth daily.  albuterol (Ventolin HFA) 90 mcg/actuation inhaler Take  by inhalation.  SITagliptin-metFORMIN (Janumet) 50-1,000 mg per tablet Take 1 Tab by mouth two (2) times daily (with meals).  atorvastatin (LIPITOR) 40 mg tablet Take 1 Tab by mouth daily. (Patient taking differently: Take 20 mg by mouth daily.)    apixaban (ELIQUIS) 5 mg tablet Take 1 Tab by mouth two (2) times a day.  spironolactone (ALDACTONE) 25 mg tablet Take 1 Tab by mouth daily.     hydroCHLOROthiazide (HYDRODIURIL) 25 mg tablet Take 1 Tab by mouth every other day. Indications: high blood pressure    traMADol (ULTRAM) 50 mg tablet Take 1 Tab by mouth three (3) times daily. Max Daily Amount: 150 mg. Indications: Pain (Patient taking differently: Take 50 mg by mouth every six (6) hours as needed. Indications: Pain)    cholecalciferol (VITAMIN D3) 1,000 unit cap Take  by mouth daily. Take 2 a day    nitroglycerin (NITROSTAT) 0.4 mg SL tablet 1 Tab by SubLINGual route every five (5) minutes as needed for Chest Pain for up to 3 doses. No current facility-administered medications for this visit. Past Surgical History:   Procedure Laterality Date    HX PARTIAL THYROIDECTOMY  2014    RT nodule with trach shift    NV CARDIAC SURG PROCEDURE UNLIST  2017    Cardiac Ablation       Diagnostic Studies:  I have reviewed the relevant tests done on the patient and show as follows  EKG tracings reviewed by me today. CARDIOLOGY STUDIES 8/8/2017   Event Monitor Result 2 wks- STach with activation and baseline     6/17 CTA Heart/Lungs/PV  The coronary arteries have normal origins and courses. There are no   distinct coronary calcifications identified, though this study was not   optimized for coronary artery evaluation. The limited images of the upper abdomen are unremarkable. IMPRESSION:   1. Normal pulmonary venous anatomy without pulmonary vein stenosis. 2. Mild biatrial enlargement. No left atrial or left atrial appendage   thrombus. 5/17 ECHO  ECHO CARDIOGRAM [address]  Component Name Value Ref Range   EF Echo 65     Result Impression   :   DECREASED LEFT VENTRICULAR CAVITY SIZE WITH LEFT VENTRICULAR HYPERTROPHY PRESENT. NORMAL GLOBAL LEFT VENTRICULAR SYSTOLIC FUNCTION WITH AN ESTIMATED EJECTION FRACTION OF   65%. MILD DIASTOLIC DYSFUNCTION. MITRAL ANNULAR CALCIFICATION. SCLEROTIC TRILEAFLET AORTIC VALVE WITHOUT EVIDENCE OF STENOSIS.    TRACE OF TRICUSPID REGURGITATION WITH A NORMAL PULMONARY ARTERY PRESSURE STRUCTURALLY NORMAL PULMONIC VALVE. NO EVIDENCE OF PERICARDIAL EFFUSION. NO MASSES, SHUNTS OR THROMBI SEEN. NO PREVIOUS REPORT FOR COMPARISON. 6/17 PVL  Conclusions: Hemodynamics in the right common femoral vein are consistent with more proximal venous obstruction. No evidence of deep venous thrombosis in the right  femoral, deep femoral, popliteal, posterior tibial and peroneal veins. Technically compromised study therefore non-occlusive deep venous thrombosis cannot be excluded in the right common femoral vein as described in the findings.    Reflux evaluation is deferred. 1/17 Nuc Stress  Conclusion:   1. Normal perfusion scan. 2. Normal wall motion and ejection fraction. 3. Low risk scan. Visit Vitals  /68   Pulse 81   Ht 5' (1.524 m)   Wt 71.2 kg (157 lb)   SpO2 98%   BMI 30.66 kg/m²       Ms. Dany Erwin has a reminder for a \"due or due soon\" health maintenance. I have asked that she contact her primary care provider for follow-up on this health maintenance. Physical Exam  Vitals and nursing note reviewed. Constitutional:       General: She is not in acute distress. Appearance: She is well-developed. She is obese. Comments: obese   HENT:      Head: Normocephalic and atraumatic. Mouth/Throat:      Dentition: Normal dentition. Eyes:      General: No scleral icterus. Right eye: No discharge. Left eye: No discharge. Neck:      Thyroid: No thyromegaly. Vascular: No carotid bruit or JVD. Cardiovascular:      Rate and Rhythm: Normal rate and regular rhythm. Pulses: Intact distal pulses. Heart sounds: Normal heart sounds, S1 normal and S2 normal. No murmur heard. No friction rub. No gallop. Pulmonary:      Effort: Pulmonary effort is normal.      Breath sounds: Normal breath sounds. No wheezing or rales. Chest:      Chest wall: No tenderness. Abdominal:      Palpations: Abdomen is soft. There is no mass. Tenderness:  There is no abdominal tenderness. Musculoskeletal:      Cervical back: Neck supple. Right lower leg: No edema. Left lower leg: No edema. Lymphadenopathy:      Cervical:      Right cervical: No superficial cervical adenopathy. Left cervical: No superficial cervical adenopathy. Skin:     General: Skin is warm and dry. Findings: No rash. Neurological:      Mental Status: She is alert and oriented to person, place, and time. Psychiatric:         Behavior: Behavior normal.         ASSESSMENT and PLAN      Patient is fairly compliant with medications but blood pressure is elevated. Increase metoprolol and follow the home chart. Diet weight and exercise discussed. HLD :   Component 03/28/22 11/26/21 02/25/21 11/20/20   Cholesterol, Total 180 186 147 --   Triglyceride 74 99 126 122   Total HDL Cholesterol 43 49 42 --   VLDL Cholesterol Calculation 14 18 23 --   LDL Cholesterol Calculation 123 119 High  82 --           10/2019  History of PAF s/p ablation. She has had no reoccurrence of Atrial fibrillation. She was previously on Coumadin, then Eliquis for DVT, but was stopped approximately 1 year ago by surgeon. [id number] = 5  Blood pressure is elevated in office today. She reports is typically 924F systolic at home. She also a newly diagnosed diabetic. Will add Losartan 50 mg, and follow home chart. She is to have labs drawn today (CBC, TSH, CMP, and lipids). Diet and exercise encouraged to promote weight loss. Atrial Fibrillation CHADSVASC2 Score Stroke Risk:   65 y.o. <65        + 0    female Female +1   CHF HX: Yes    +1   HTN HX: Yes    +1   Stroke/TIA/Thromboembolism No    +0   Vascular Disease HX: No    + 0   Diabetes Mellitus Yes    +1   CHADSVASC 2 Score 4      Annual Stroke Risk 4.8%- moderate-high          LLE DVT in 2017       2/21 A. fib is staying in sinus rhythm. Will continue Eliquis. Blood pressure and heart rate are elevated. Patient taking metoprolol only once a day. She will start taking it twice a day. Follow home chart. She is losing weight gradually. Unfortunately not using CPAP. Diabetes is not controlled. Long discussion about diet. Mediterranean diet guidelines printed. Lipids are not controlled. Increase Lipitor and follow-up the labs. Periodic follow-up of electrolytes due to Aldactone use. 8/6/2021 blood pressure is mildly elevated. Some home medications were reduced and as patient says, by PCP. I think losartan will be good given the fact that she has diabetes. Her heart rate is top normal stop metoprolol need for better higher dose. If her blood pressure falls significantly, would recommend that she reduce Norvasc. Increase metoprolol and restarted losartan. Follow the home chart. Follow electrolytes due to Aldactone. Diet weight and exercise discussed. Mediterranean diet guidelines printed. She is staying in sinus rhythm confirmed by EKG today. CHF is nearly resolved NYHA class I.      Diagnoses and all orders for this visit:    1. Essential hypertension    2. Palpitations  Comments:  9/17 isaac Curtis metoprolol; reduce HCTZ  Orders:  -     metoprolol tartrate (LOPRESSOR) 50 mg tablet; Take 1 Tablet by mouth two (2) times a day. Indications: high blood pressure    3. Paroxysmal atrial fibrillation (HCC)    4. Obesity (BMI 30.0-34.9)    5. Hypercholesteremia  -     atorvastatin (LIPITOR) 40 mg tablet; Take 1 Tablet by mouth daily.  -     LIPID PANEL; Future  -     HEPATIC FUNCTION PANEL; Future        Pertinent laboratory and test data reviewed and discussed with patient.   See patient instructions also for other medical advice given    Medications Discontinued During This Encounter   Medication Reason    atorvastatin (LIPITOR) 40 mg tablet REORDER    metoprolol tartrate (LOPRESSOR) 50 mg tablet REORDER       Follow-up and Dispositions    · Return in about 6 months (around 10/1/2022), or if symptoms worsen or fail to improve, for post test. 4/1/2022 palpitations have resolved since metoprolol was increased. Blood pressure is controlled. She is trying to lose weight. Lipids are not controlled as she has reduced the dose of Lipitor. It was increased again. Repeat labs in 3 months. Healthy lifestyle discussed. X regular exercise encouraged along with weight loss.

## 2022-04-01 NOTE — PATIENT INSTRUCTIONS
Medications Discontinued During This Encounter   Medication Reason    atorvastatin (LIPITOR) 40 mg tablet REORDER    metoprolol tartrate (LOPRESSOR) 50 mg tablet REORDER          A Healthy Heart: Care Instructions  Your Care Instructions     Coronary artery disease, also called heart disease, occurs when a substance called plaque builds up in the vessels that supply oxygen-rich blood to your heart muscle. This can narrow the blood vessels and reduce blood flow. A heart attack happens when blood flow is completely blocked. A high-fat diet, smoking, and other factors increase the risk of heart disease. Your doctor has found that you have a chance of having heart disease. You can do lots of things to keep your heart healthy. It may not be easy, but you can change your diet, exercise more, and quit smoking. These steps really work to lower your chance of heart disease. Follow-up care is a key part of your treatment and safety. Be sure to make and go to all appointments, and call your doctor if you are having problems. It's also a good idea to know your test results and keep a list of the medicines you take. How can you care for yourself at home? Diet    · Use less salt when you cook and eat. This helps lower your blood pressure. Taste food before salting. Add only a little salt when you think you need it. With time, your taste buds will adjust to less salt.     · Eat fewer snack items, fast foods, canned soups, and other high-salt, high-fat, processed foods.     · Read food labels and try to avoid saturated and trans fats. They increase your risk of heart disease by raising cholesterol levels.     · Limit the amount of solid fat-butter, margarine, and shortening-you eat. Use olive, peanut, or canola oil when you cook. Bake, broil, and steam foods instead of frying them.     · Eat a variety of fruit and vegetables every day. Dark green, deep orange, red, or yellow fruits and vegetables are especially good for you. Examples include spinach, carrots, peaches, and berries.     · Foods high in fiber can reduce your cholesterol and provide important vitamins and minerals. High-fiber foods include whole-grain cereals and breads, oatmeal, beans, brown rice, citrus fruits, and apples.     · Eat lean proteins. Heart-healthy proteins include seafood, lean meats and poultry, eggs, beans, peas, nuts, seeds, and soy products.     · Limit drinks and foods with added sugar. These include candy, desserts, and soda pop. Lifestyle changes    · If your doctor recommends it, get more exercise. Walking is a good choice. Bit by bit, increase the amount you walk every day. Try for at least 30 minutes on most days of the week. You also may want to swim, bike, or do other activities.     · Do not smoke. If you need help quitting, talk to your doctor about stop-smoking programs and medicines. These can increase your chances of quitting for good. Quitting smoking may be the most important step you can take to protect your heart. It is never too late to quit.     · Limit alcohol to 2 drinks a day for men and 1 drink a day for women. Too much alcohol can cause health problems.     · Manage other health problems such as diabetes, high blood pressure, and high cholesterol. If you think you may have a problem with alcohol or drug use, talk to your doctor. Medicines    · Take your medicines exactly as prescribed. Call your doctor if you think you are having a problem with your medicine.     · If your doctor recommends aspirin, take the amount directed each day. Make sure you take aspirin and not another kind of pain reliever, such as acetaminophen (Tylenol). When should you call for help? Call 911 if you have symptoms of a heart attack.  These may include:    · Chest pain or pressure, or a strange feeling in the chest.     · Sweating.     · Shortness of breath.     · Pain, pressure, or a strange feeling in the back, neck, jaw, or upper belly or in one or both shoulders or arms.     · Lightheadedness or sudden weakness.     · A fast or irregular heartbeat. After you call 911, the  may tell you to chew 1 adult-strength or 2 to 4 low-dose aspirin. Wait for an ambulance. Do not try to drive yourself. Watch closely for changes in your health, and be sure to contact your doctor if you have any problems. Where can you learn more? Go to http://www.tilley.com/  Enter F075 in the search box to learn more about \"A Healthy Heart: Care Instructions. \"  Current as of: January 10, 2022               Content Version: 13.2  © 4631-6033 [employer]. Care instructions adapted under license by [employer] (which disclaims liability or warranty for this information). If you have questions about a medical condition or this instruction, always ask your healthcare professional. [place] [address] any warranty or liability for your use of this information.

## 2022-04-01 NOTE — PROGRESS NOTES
1. Have you been to the ER, urgent care clinic since your last visit? Hospitalized since your last visit?    no    2. Have you seen or consulted any other health care providers outside of the [address] [place] [address] since your last visit? Include any pap smears or colon screening. Yes pcp    3. Since your last visit, have you had any of the following symptoms?           no cardiac symptoms    4. Have you had any blood work, X-rays or cardiac testing? [facility] in [employer]             5.  Where do you normally have your labs drawn? PCP    6. Do you need any refills today?    yes

## 2022-06-09 ENCOUNTER — TELEPHONE (OUTPATIENT)
Dept: CARDIOLOGY CLINIC | Age: 57
End: 2022-06-09

## 2022-06-09 LAB
ALBUMIN SERPL-MCNC: 4.5 G/DL (ref 3.8–4.9)
ALP SERPL-CCNC: 76 IU/L (ref 44–121)
ALT SERPL-CCNC: 14 IU/L (ref 0–32)
AST SERPL-CCNC: 13 IU/L (ref 0–40)
BILIRUB DIRECT SERPL-MCNC: 0.11 MG/DL (ref 0–0.4)
BILIRUB SERPL-MCNC: 0.3 MG/DL (ref 0–1.2)
CHOLEST SERPL-MCNC: 173 MG/DL (ref 100–199)
HDLC SERPL-MCNC: 36 MG/DL
LDLC SERPL CALC-MCNC: 120 MG/DL (ref 0–99)
PROT SERPL-MCNC: 6.9 G/DL (ref 6–8.5)
TRIGL SERPL-MCNC: 89 MG/DL (ref 0–149)
VLDLC SERPL CALC-MCNC: 17 MG/DL (ref 5–40)

## 2022-06-09 NOTE — TELEPHONE ENCOUNTER
Spoke with pt regarding lipids not being controlled. Pt ran out of atorvastatin 1 week ago, advised 90 day with one refill sent to pharmacy on 4/1/22 pt will .

## 2022-06-09 NOTE — TELEPHONE ENCOUNTER
----- Message from Alex Goodman NP sent at 6/9/2022  3:58 PM EDT -----  Lipids are not controlled. Please confirm patient has increased atorvastatin to 40 mg/day as per Dr. Sophia Hernandez last office note.   (She had decreased to 20 )

## 2022-06-09 NOTE — PROGRESS NOTES
Lipids are not controlled. Please confirm patient has increased atorvastatin to 40 mg/day as per Dr. Pilar Gould last office note.   (She had decreased to 20 )

## 2022-06-10 DIAGNOSIS — E78.00 HYPERCHOLESTEREMIA: ICD-10-CM

## 2022-10-14 ENCOUNTER — OFFICE VISIT (OUTPATIENT)
Dept: CARDIOLOGY CLINIC | Age: 57
End: 2022-10-14
Payer: MEDICAID

## 2022-10-14 VITALS
HEIGHT: 60 IN | WEIGHT: 157 LBS | HEART RATE: 106 BPM | DIASTOLIC BLOOD PRESSURE: 82 MMHG | OXYGEN SATURATION: 96 % | BODY MASS INDEX: 30.82 KG/M2 | SYSTOLIC BLOOD PRESSURE: 144 MMHG

## 2022-10-14 DIAGNOSIS — R00.2 PALPITATIONS: ICD-10-CM

## 2022-10-14 DIAGNOSIS — Z91.199 NONCOMPLIANCE: ICD-10-CM

## 2022-10-14 DIAGNOSIS — Z86.79 S/P ABLATION OF ATRIAL FIBRILLATION: ICD-10-CM

## 2022-10-14 DIAGNOSIS — E66.9 OBESITY (BMI 30.0-34.9): ICD-10-CM

## 2022-10-14 DIAGNOSIS — I10 ESSENTIAL HYPERTENSION: ICD-10-CM

## 2022-10-14 DIAGNOSIS — G47.33 OSA (OBSTRUCTIVE SLEEP APNEA): ICD-10-CM

## 2022-10-14 DIAGNOSIS — Z98.890 S/P ABLATION OF ATRIAL FIBRILLATION: ICD-10-CM

## 2022-10-14 DIAGNOSIS — E78.00 HYPERCHOLESTEREMIA: ICD-10-CM

## 2022-10-14 DIAGNOSIS — I48.0 PAROXYSMAL ATRIAL FIBRILLATION (HCC): Primary | ICD-10-CM

## 2022-10-14 PROCEDURE — 99214 OFFICE O/P EST MOD 30 MIN: CPT | Performed by: INTERNAL MEDICINE

## 2022-10-14 PROCEDURE — 93000 ELECTROCARDIOGRAM COMPLETE: CPT | Performed by: INTERNAL MEDICINE

## 2022-10-14 RX ORDER — METOPROLOL TARTRATE 100 MG/1
100 TABLET ORAL 2 TIMES DAILY
Qty: 180 TABLET | Refills: 1 | Status: SHIPPED | OUTPATIENT
Start: 2022-10-14

## 2022-10-14 RX ORDER — GLIPIZIDE 5 MG/1
5 TABLET ORAL
COMMUNITY
Start: 2022-10-05

## 2022-10-14 NOTE — PATIENT INSTRUCTIONS
Medications Discontinued During This Encounter   Medication Reason    metoprolol tartrate (LOPRESSOR) 50 mg tablet          A Healthy Heart: Care Instructions  Your Care Instructions     Coronary artery disease, also called heart disease, occurs when a substance called plaque builds up in the vessels that supply oxygen-rich blood to your heart muscle. This can narrow the blood vessels and reduce blood flow. A heart attack happens when blood flow is completely blocked. A high-fat diet, smoking, and other factors increase the risk of heart disease. Your doctor has found that you have a chance of having heart disease. You can do lots of things to keep your heart healthy. It may not be easy, but you can change your diet, exercise more, and quit smoking. These steps really work to lower your chance of heart disease. Follow-up care is a key part of your treatment and safety. Be sure to make and go to all appointments, and call your doctor if you are having problems. It's also a good idea to know your test results and keep a list of the medicines you take. How can you care for yourself at home? Diet    Use less salt when you cook and eat. This helps lower your blood pressure. Taste food before salting. Add only a little salt when you think you need it. With time, your taste buds will adjust to less salt. Eat fewer snack items, fast foods, canned soups, and other high-salt, high-fat, processed foods. Read food labels and try to avoid saturated and trans fats. They increase your risk of heart disease by raising cholesterol levels. Limit the amount of solid fat-butter, margarine, and shortening-you eat. Use olive, peanut, or canola oil when you cook. Bake, broil, and steam foods instead of frying them. Eat a variety of fruit and vegetables every day. Dark green, deep orange, red, or yellow fruits and vegetables are especially good for you. Examples include spinach, carrots, peaches, and berries.      Foods high in fiber can reduce your cholesterol and provide important vitamins and minerals. High-fiber foods include whole-grain cereals and breads, oatmeal, beans, brown rice, citrus fruits, and apples. Eat lean proteins. Heart-healthy proteins include seafood, lean meats and poultry, eggs, beans, peas, nuts, seeds, and soy products. Limit drinks and foods with added sugar. These include candy, desserts, and soda pop. Lifestyle changes    If your doctor recommends it, get more exercise. Walking is a good choice. Bit by bit, increase the amount you walk every day. Try for at least 30 minutes on most days of the week. You also may want to swim, bike, or do other activities. Do not smoke. If you need help quitting, talk to your doctor about stop-smoking programs and medicines. These can increase your chances of quitting for good. Quitting smoking may be the most important step you can take to protect your heart. It is never too late to quit. Limit alcohol to 2 drinks a day for men and 1 drink a day for women. Too much alcohol can cause health problems. Manage other health problems such as diabetes, high blood pressure, and high cholesterol. If you think you may have a problem with alcohol or drug use, talk to your doctor. Medicines    Take your medicines exactly as prescribed. Call your doctor if you think you are having a problem with your medicine. If your doctor recommends aspirin, take the amount directed each day. Make sure you take aspirin and not another kind of pain reliever, such as acetaminophen (Tylenol). When should you call for help? Call 911 if you have symptoms of a heart attack. These may include:    Chest pain or pressure, or a strange feeling in the chest.     Sweating. Shortness of breath. Pain, pressure, or a strange feeling in the back, neck, jaw, or upper belly or in one or both shoulders or arms. Lightheadedness or sudden weakness.      A fast or irregular heartbeat. After you call 911, the  may tell you to chew 1 adult-strength or 2 to 4 low-dose aspirin. Wait for an ambulance. Do not try to drive yourself. Watch closely for changes in your health, and be sure to contact your doctor if you have any problems. Where can you learn more? Go to http://www.tilley.com/  Enter F075 in the search box to learn more about \"A Healthy Heart: Care Instructions. \"  Current as of: January 10, 2022               Content Version: 13.2  © 0691-5833 [facility]. Care instructions adapted under license by [employer] (which disclaims liability or warranty for this information). If you have questions about a medical condition or this instruction, always ask your healthcare professional. [address] any warranty or liability for your use of this information.

## 2022-10-14 NOTE — PROGRESS NOTES
1. Have you been to the ER, urgent care clinic since your last visit? Hospitalized since your last visit? No    2. Have you seen or consulted any other health care providers outside of the [address] [place] [address] since your last visit? Include any pap smears or colon screening. No     3. Since your last visit, have you had any of the following symptoms? 4. Have you had any blood work, X-rays or cardiac testing? Yes Where: [facility]     Requested: NO     In [facility]: YES    5. Where do you normally have your labs drawn? [facility]    6. Do you need any refills today?    NO

## 2022-10-14 NOTE — PROGRESS NOTES
HISTORY OF PRESENT ILLNESS  Matt Lemus is a 65 y.o. female. Follow-up of atrial fibrillation, hypertension, status post A. fib ablation, obesity,  History of diabetes, VEL    Follow-up  Pertinent negatives include no chest pain, no headaches and no shortness of breath. Palpitations   The history is provided by the Patient. This is a new problem. The current episode started more than 1 week ago (yrs). The problem has been resolved (PAF). The problem occurs every several days (2/wk). On average, each episode lasts 1 hour. The problem is associated with nothing. Associated symptoms include lower extremity edema. Pertinent negatives include no diaphoresis, no fever, no malaise/fatigue, no chest pain, no claudication, no orthopnea, no PND, no nausea, no vomiting, no headaches, no dizziness, no cough and no shortness of breath. Her past medical history is significant for hypertension. Review of Systems   Constitutional:  Negative for chills, diaphoresis, fever, malaise/fatigue and weight loss. HENT:  Negative for nosebleeds. Eyes:  Negative for discharge. Respiratory:  Negative for cough, shortness of breath and wheezing. Cardiovascular:  Positive for palpitations (Status post A. fib ablation). Negative for chest pain, orthopnea, claudication, leg swelling and PND. Gastrointestinal:  Negative for diarrhea, nausea and vomiting. Genitourinary:  Negative for dysuria and hematuria. Musculoskeletal:  Negative for joint pain. Skin:  Negative for rash. Neurological:  Negative for dizziness, seizures, loss of consciousness and headaches. Endo/Heme/Allergies:  Negative for polydipsia. Does not bruise/bleed easily. Psychiatric/Behavioral:  Negative for depression and substance abuse. The patient does not have insomnia.     No Known Allergies    Past Medical History:   Diagnosis Date    Adverse effect of anesthesia 11/03/2014    per surgeon's Thyroid Lobectomy note: bronchospasm at the end of the case prior to extubation    Asthma     Atrial fibrillation (HCC)     Chronic diastolic heart failure of unknown etiology ([facility] [address]     Per cardiolgy note    Essential hypertension 2017    GERD (gastroesophageal reflux disease)     Hypercholesteremia 2021    Palpitations 2017 rare but tody lasted 1 hr d/w pt- will wait for more testing    Sleep apnea     not using cpap, not tolerated    Thyroid disease     nodules    Type 2 diabetes mellitus without complication, without long-term current use of insulin ([facility] 2021       Family History   Problem Relation Age of Onset    Heart Attack Father 48    Stroke Father     Heart Surgery Father        Social History     Tobacco Use    Smoking status: Former     Types: Cigarettes     Quit date: 1/10/1982     Years since quittin.7    Smokeless tobacco: Never   Vaping Use    Vaping Use: Never used   Substance Use Topics    Alcohol use: Not Currently     Comment: rare wine glass <1/month not drinking    Drug use: Never        Current Outpatient Medications   Medication Sig    glipiZIDE (GLUCOTROL) 5 mg tablet Take 5 mg by mouth Daily (before breakfast). metoprolol tartrate (LOPRESSOR) 50 mg tablet Take 1 Tablet by mouth two (2) times a day. Indications: high blood pressure    atorvastatin (LIPITOR) 40 mg tablet Take 1 Tablet by mouth daily. Invokana 100 mg tablet 300 mg. amLODIPine (NORVASC) 5 mg tablet Take 5 mg by mouth daily. omeprazole (PRILOSEC OTC) 20 mg tablet Take 20 mg by mouth as needed. albuterol (PROVENTIL HFA, VENTOLIN HFA, PROAIR HFA) 90 mcg/actuation inhaler Take  by inhalation. SITagliptin-metFORMIN (Janumet) 50-1,000 mg per tablet Take 1 Tab by mouth two (2) times daily (with meals). apixaban (ELIQUIS) 5 mg tablet Take 1 Tab by mouth two (2) times a day. spironolactone (ALDACTONE) 25 mg tablet Take 1 Tab by mouth daily. hydroCHLOROthiazide (HYDRODIURIL) 25 mg tablet Take 1 Tab by mouth every other day. Indications: high blood pressure    traMADol (ULTRAM) 50 mg tablet Take 1 Tab by mouth three (3) times daily. Max Daily Amount: 150 mg. Indications: Pain (Patient taking differently: Take 50 mg by mouth every six (6) hours as needed. Indications: pain)    cholecalciferol (VITAMIN D3) 25 mcg (1,000 unit) cap Take  by mouth daily. Take 2 a day    nitroglycerin (NITROSTAT) 0.4 mg SL tablet 1 Tab by SubLINGual route every five (5) minutes as needed for Chest Pain for up to 3 doses. losartan (COZAAR) 50 mg tablet Take 1 Tablet by mouth daily. (Patient taking differently: Take 25 mg by mouth daily.)     No current facility-administered medications for this visit. Past Surgical History:   Procedure Laterality Date    HX PARTIAL THYROIDECTOMY  2014    RT nodule with trach shift    ME CARDIAC SURG PROCEDURE UNLIST  2017    Cardiac Ablation       Diagnostic Studies:  I have reviewed the relevant tests done on the patient and show as follows  EKG tracings reviewed by me today. CARDIOLOGY STUDIES 8/8/2017   Event Monitor Result 2 wks- STach with activation and baseline   Some recent data might be hidden     6/17 CTA Heart/Lungs/PV  The coronary arteries have normal origins and courses. There are no   distinct coronary calcifications identified, though this study was not   optimized for coronary artery evaluation. The limited images of the upper abdomen are unremarkable. IMPRESSION:   1. Normal pulmonary venous anatomy without pulmonary vein stenosis. 2. Mild biatrial enlargement. No left atrial or left atrial appendage   thrombus. 5/17 ECHO  ECHO CARDIOGRAM [address]  Component Name Value Ref Range   EF Echo 65     Result Impression   :   DECREASED LEFT VENTRICULAR CAVITY SIZE WITH LEFT VENTRICULAR HYPERTROPHY PRESENT. NORMAL GLOBAL LEFT VENTRICULAR SYSTOLIC FUNCTION WITH AN ESTIMATED EJECTION FRACTION OF   65%. MILD DIASTOLIC DYSFUNCTION.    MITRAL ANNULAR CALCIFICATION. SCLEROTIC TRILEAFLET AORTIC VALVE WITHOUT EVIDENCE OF STENOSIS. TRACE OF TRICUSPID REGURGITATION WITH A NORMAL PULMONARY ARTERY PRESSURE   STRUCTURALLY NORMAL PULMONIC VALVE. NO EVIDENCE OF PERICARDIAL EFFUSION. NO MASSES, SHUNTS OR THROMBI SEEN. NO PREVIOUS REPORT FOR COMPARISON. 6/17 PVL  Conclusions: Hemodynamics in the right common femoral vein are consistent with more proximal venous obstruction. No evidence of deep venous thrombosis in the right  femoral, deep femoral, popliteal, posterior tibial and peroneal veins. Technically compromised study therefore non-occlusive deep venous thrombosis cannot be excluded in the right common femoral vein as described in the findings. Reflux evaluation is deferred. 1/17 Nuc Stress  Conclusion:   1. Normal perfusion scan. 2. Normal wall motion and ejection fraction. 3. Low risk scan. Visit Vitals  BP (!) 144/82 (BP 1 Location: Left upper arm, BP Patient Position: Sitting, BP Cuff Size: Small adult)   Pulse (!) 106   Ht 5' (1.524 m)   Wt 71.2 kg (157 lb)   SpO2 96%   BMI 30.66 kg/m²       Ms. Gaurav Roa has a reminder for a \"due or due soon\" health maintenance. I have asked that she contact her primary care provider for follow-up on this health maintenance. Physical Exam  Vitals and nursing note reviewed. Constitutional:       General: She is not in acute distress. Appearance: She is well-developed. She is obese. Comments: obese   HENT:      Head: Normocephalic and atraumatic. Mouth/Throat:      Dentition: Normal dentition. Eyes:      General: No scleral icterus. Right eye: No discharge. Left eye: No discharge. Neck:      Thyroid: No thyromegaly. Vascular: No carotid bruit or JVD. Cardiovascular:      Rate and Rhythm: Normal rate and regular rhythm. Pulses: Intact distal pulses. Heart sounds: Normal heart sounds, S1 normal and S2 normal. No murmur heard. No friction rub.  No gallop. Pulmonary:      Effort: Pulmonary effort is normal.      Breath sounds: Normal breath sounds. No wheezing or rales. Chest:      Chest wall: No tenderness. Abdominal:      Palpations: Abdomen is soft. There is no mass. Tenderness: There is no abdominal tenderness. Musculoskeletal:      Cervical back: Neck supple. Right lower leg: No edema. Left lower leg: No edema. Lymphadenopathy:      Cervical:      Right cervical: No superficial cervical adenopathy. Left cervical: No superficial cervical adenopathy. Skin:     General: Skin is warm and dry. Findings: No rash. Neurological:      Mental Status: She is alert and oriented to person, place, and time. Psychiatric:         Behavior: Behavior normal.       ASSESSMENT and PLAN      Patient is fairly compliant with medications but blood pressure is elevated. Increase metoprolol and follow the home chart. Diet weight and exercise discussed. HLD :   Component 03/28/22 11/26/21 02/25/21 11/20/20   Cholesterol, Total 180 186 147 --   Triglyceride 74 99 126 122   Total HDL Cholesterol 43 49 42 --   VLDL Cholesterol Calculation 14 18 23 --   LDL Cholesterol Calculation 123 119 High  82 --           10/2019  History of PAF s/p ablation. She has had no reoccurrence of Atrial fibrillation. She was previously on Coumadin, then Eliquis for DVT, but was stopped approximately 1 year ago by surgeon. [id number] = 5  Blood pressure is elevated in office today. She reports is typically 002V systolic at home. She also a newly diagnosed diabetic. Will add Losartan 50 mg, and follow home chart. She is to have labs drawn today (CBC, TSH, CMP, and lipids). Diet and exercise encouraged to promote weight loss.     Atrial Fibrillation CHADSVASC2 Score Stroke Risk:   65 y.o. <65        + 0    female Female +1   CHF HX: Yes    +1   HTN HX: Yes    +1   Stroke/TIA/Thromboembolism No    +0   Vascular Disease HX: No    + 0   Diabetes Mellitus Yes +1   CHADSVASC 2 Score 4      Annual Stroke Risk 4.8%- moderate-high          LLE DVT in 2017       2/21 A. yeni is staying in sinus rhythm. Will continue Eliquis. Blood pressure and heart rate are elevated. Patient taking metoprolol only once a day. She will start taking it twice a day. Follow home chart. She is losing weight gradually. Unfortunately not using CPAP. Diabetes is not controlled. Long discussion about diet. Mediterranean diet guidelines printed. Lipids are not controlled. Increase Lipitor and follow-up the labs. Periodic follow-up of electrolytes due to Aldactone use. 8/6/2021 blood pressure is mildly elevated. Some home medications were reduced and as patient says, by PCP. I think losartan will be good given the fact that she has diabetes. Her heart rate is top normal stop metoprolol need for better higher dose. If her blood pressure falls significantly, would recommend that she reduce Norvasc. Increase metoprolol and restarted losartan. Follow the home chart. Follow electrolytes due to Aldactone. Diet weight and exercise discussed. Mediterranean diet guidelines printed. She is staying in sinus rhythm confirmed by EKG today. CHF is nearly resolved NYHA class I.    4/1/2022 palpitations have resolved since metoprolol was increased. Blood pressure is controlled. She is trying to lose weight. Lipids are not controlled as she has reduced the dose of Lipitor. It was increased again. Repeat labs in 3 months. Healthy lifestyle discussed. X regular exercise encouraged along with weight loss. Diagnoses and all orders for this visit:    1. Paroxysmal atrial fibrillation (HCC)  -     AMB POC EKG ROUTINE W/ 12 LEADS, INTER & REP    2. Essential hypertension  -     AMB POC EKG ROUTINE W/ 12 LEADS, INTER & REP  -     METABOLIC PANEL, BASIC; Future    3.  Palpitations  Comments:  9/17; 10/22 isaac Curtis metoprolol;   Orders:  -     metoprolol tartrate (LOPRESSOR) 100 mg IR tablet; Take 1 Tablet by mouth two (2) times a day. Indications: high blood pressure    4. Obesity (BMI 30.0-34.9)    5. Hypercholesteremia  -     LIPID PANEL; Future  -     HEPATIC FUNCTION PANEL; Future    6. S/P ablation of atrial fibrillation    7. VEL (obstructive sleep apnea)    8. Noncompliance      Pertinent laboratory and test data reviewed and discussed with patient. See patient instructions also for other medical advice given    Medications Discontinued During This Encounter   Medication Reason    metoprolol tartrate (LOPRESSOR) 50 mg tablet        Follow-up and Dispositions    Return in about 6 months (around 4/14/2023), or if symptoms worsen or fail to improve, for post test.       10/14/2022 staying in sinus rhythm post A. fib ablation few years ago. Continue anticoagulation and patient not on any antiarrhythmics. Blood pressure is mildly elevated and she is noncompliant with medications as she admitted quite frequently. We discussed the benefits of controlling blood pressure to reduce cardiovascular events and strokes. She understands and will try to be compliant. Lipids were also not controlled in 6/22 due to noncompliance. Check it again in 3 months and hopefully she will be compliant. Diet weight and exercise stressed. Encouraged to lose weight. She has Mediterranean guidelines and will try to follow more strictly.

## 2022-12-23 DIAGNOSIS — I10 ESSENTIAL HYPERTENSION: ICD-10-CM

## 2022-12-23 DIAGNOSIS — E78.00 HYPERCHOLESTEREMIA: ICD-10-CM

## 2022-12-28 ENCOUNTER — TELEPHONE (OUTPATIENT)
Dept: CARDIOLOGY CLINIC | Age: 57
End: 2022-12-28

## 2022-12-28 DIAGNOSIS — E78.00 HYPERCHOLESTEREMIA: Primary | ICD-10-CM

## 2022-12-28 NOTE — PROGRESS NOTES
Please contact patient and do the following asap  Fax to PCP for elevated blood sugar. Check if compliant with cholesterol meds  Get the names and doses of meds that patient takes and show me asap  Please reinforce diet and exercise.

## 2022-12-28 NOTE — TELEPHONE ENCOUNTER
Patient was called per Dr. Felicia Whittaker to repeat labs in 3 months. She voices understanding and acceptance of this advice and will call back if any further questions or concerns.

## 2022-12-28 NOTE — TELEPHONE ENCOUNTER
----- Message from Rachelle Cross MD sent at 12/28/2022  8:55 AM EST -----  Please contact patient and do the following asap  Fax to PCP for elevated blood sugar. Check if compliant with cholesterol meds  Get the names and doses of meds that patient takes and show me asap  Please reinforce diet and exercise.

## 2022-12-28 NOTE — TELEPHONE ENCOUNTER
Spoke to patient per Dr. Elliott Finch regarding lab/test. Lab reviewed. Please contact patient and do the following asap  Fax to PCP for elevated blood sugar. Check if compliant with cholesterol meds  Get the names and doses of meds that patient takes and show me asap  Please reinforce diet and exercise. Patient states she hasn't been taking Lipitor daily as prescribed and she will start back taking it daily and reinforce her diet and exercise. Fax labs to PCP.  Please advise

## 2023-02-04 DIAGNOSIS — E78.00 HYPERCHOLESTEREMIA: Primary | ICD-10-CM

## 2023-02-05 DIAGNOSIS — E78.00 HYPERCHOLESTEREMIA: Primary | ICD-10-CM

## 2023-03-08 DIAGNOSIS — E78.00 HYPERCHOLESTEREMIA: ICD-10-CM

## 2024-12-02 ENCOUNTER — OFFICE VISIT (OUTPATIENT)
Age: 59
End: 2024-12-02
Payer: COMMERCIAL

## 2024-12-02 VITALS
BODY MASS INDEX: 30.82 KG/M2 | HEART RATE: 83 BPM | SYSTOLIC BLOOD PRESSURE: 125 MMHG | HEIGHT: 60 IN | WEIGHT: 157 LBS | OXYGEN SATURATION: 97 % | DIASTOLIC BLOOD PRESSURE: 75 MMHG

## 2024-12-02 DIAGNOSIS — I10 ESSENTIAL HYPERTENSION: ICD-10-CM

## 2024-12-02 DIAGNOSIS — E78.00 PURE HYPERCHOLESTEROLEMIA: ICD-10-CM

## 2024-12-02 DIAGNOSIS — I50.32 CHRONIC DIASTOLIC CONGESTIVE HEART FAILURE (HCC): Primary | ICD-10-CM

## 2024-12-02 DIAGNOSIS — E11.9 TYPE 2 DIABETES MELLITUS WITHOUT COMPLICATION, WITHOUT LONG-TERM CURRENT USE OF INSULIN (HCC): ICD-10-CM

## 2024-12-02 DIAGNOSIS — I48.0 PAROXYSMAL ATRIAL FIBRILLATION (HCC): ICD-10-CM

## 2024-12-02 DIAGNOSIS — E66.811 OBESITY (BMI 30.0-34.9): ICD-10-CM

## 2024-12-02 PROCEDURE — 93000 ELECTROCARDIOGRAM COMPLETE: CPT | Performed by: INTERNAL MEDICINE

## 2024-12-02 PROCEDURE — 99214 OFFICE O/P EST MOD 30 MIN: CPT | Performed by: INTERNAL MEDICINE

## 2024-12-02 PROCEDURE — 3074F SYST BP LT 130 MM HG: CPT | Performed by: INTERNAL MEDICINE

## 2024-12-02 PROCEDURE — 3078F DIAST BP <80 MM HG: CPT | Performed by: INTERNAL MEDICINE

## 2024-12-02 RX ORDER — DULAGLUTIDE 0.75 MG/.5ML
0.75 INJECTION, SOLUTION SUBCUTANEOUS WEEKLY
COMMUNITY

## 2024-12-02 RX ORDER — HYDROCHLOROTHIAZIDE 25 MG/1
25 TABLET ORAL EVERY OTHER DAY
Qty: 30 TABLET | Refills: 3 | Status: SHIPPED | OUTPATIENT
Start: 2024-12-02

## 2024-12-02 ASSESSMENT — PATIENT HEALTH QUESTIONNAIRE - PHQ9
1. LITTLE INTEREST OR PLEASURE IN DOING THINGS: NOT AT ALL
2. FEELING DOWN, DEPRESSED OR HOPELESS: NOT AT ALL
SUM OF ALL RESPONSES TO PHQ QUESTIONS 1-9: 0
SUM OF ALL RESPONSES TO PHQ QUESTIONS 1-9: 0
SUM OF ALL RESPONSES TO PHQ9 QUESTIONS 1 & 2: 0
DEPRESSION UNABLE TO ASSESS: FUNCTIONAL CAPACITY MOTIVATION LIMITS ACCURACY
SUM OF ALL RESPONSES TO PHQ QUESTIONS 1-9: 0
SUM OF ALL RESPONSES TO PHQ QUESTIONS 1-9: 0

## 2024-12-02 ASSESSMENT — ENCOUNTER SYMPTOMS
GASTROINTESTINAL NEGATIVE: 1
EYES NEGATIVE: 1
SHORTNESS OF BREATH: 1

## 2024-12-02 NOTE — PROGRESS NOTES
1. Have you been to the ER, urgent care clinic since your last visit?  Hospitalized since your last visit?     No      2.  Where do you normally have your labs drawn?   [facility]    3. Do you need any refills today?   Yes    4. Which local pharmacy do you use (enter pharmacy)?   [facility]    5. Which mail order pharmacy do you use (enter pharmacy)?   No     6. Are you here for surgical clearance and if so who will be doing your     procedure/surgery (care team)?   No      
Fibrillation CHADSVASC2 Score Stroke Risk:   57 y.o. <65        + 0    female Female +1   CHF HX: Yes    +1   HTN HX: Yes    +1   Stroke/TIA/Thromboembolism No    +0   Vascular Disease HX: No    + 0   Diabetes Mellitus Yes    +1   CHADSVASC 2 Score 4      Annual Stroke Risk 4.8%- moderate-high          LLE DVT in 2017       2/21 A. fib is staying in sinus rhythm.  Will continue Eliquis.  Blood pressure and heart rate are elevated.  Patient taking metoprolol only once a day.  She will start taking it twice a day.  Follow home chart.  She is losing weight gradually.  Unfortunately not using CPAP.  Diabetes is not controlled.  Long discussion about diet.  Mediterranean diet guidelines printed.  Lipids are not controlled.  Increase Lipitor and follow-up the labs.  Periodic follow-up of electrolytes due to Aldactone use.    8/6/2021 blood pressure is mildly elevated.  Some home medications were reduced and as patient says, by PCP.  I think losartan will be good given the fact that she has diabetes.  Her heart rate is top normal stop metoprolol need for better higher dose.  If her blood pressure falls significantly, would recommend that she reduce Norvasc.  Increase metoprolol and restarted losartan.  Follow the home chart.  Follow electrolytes due to Aldactone.  Diet weight and exercise discussed.  Mediterranean diet guidelines printed.  She is staying in sinus rhythm confirmed by EKG today.  CHF is nearly resolved NYHA class I.    4/1/2022 palpitations have resolved since metoprolol was increased.  Blood pressure is controlled.  She is trying to lose weight.  Lipids are not controlled as she has reduced the dose of Lipitor.  It was increased again.  Repeat labs in 3 months.  Healthy lifestyle discussed.  X regular exercise encouraged along with weight loss.    10/14/2022 staying in sinus rhythm post A. fib ablation few years ago.  Continue anticoagulation and patient not on any antiarrhythmics.  Blood pressure is mildly

## 2025-07-30 DIAGNOSIS — I50.32 CHRONIC DIASTOLIC CONGESTIVE HEART FAILURE (HCC): ICD-10-CM

## 2025-07-30 DIAGNOSIS — I10 ESSENTIAL HYPERTENSION: ICD-10-CM

## 2025-07-30 RX ORDER — HYDROCHLOROTHIAZIDE 25 MG/1
25 TABLET ORAL EVERY OTHER DAY
Qty: 30 TABLET | Refills: 3 | OUTPATIENT
Start: 2025-07-30

## (undated) DEVICE — BASIN EMESIS 500CC ROSE 250/CS 60/PLT: Brand: MEDEGEN MEDICAL PRODUCTS, LLC

## (undated) DEVICE — AIRLIFE™ NASAL OXYGEN CANNULA CURVED, FLARED TIP WITH 14 FOOT (4.3 M) CRUSH-RESISTANT TUBING, OVER-THE-EAR STYLE: Brand: AIRLIFE™

## (undated) DEVICE — CATHETER SUCT TR FL TIP 14FR W/ O CTRL

## (undated) DEVICE — SYRINGE MED 25GA 3ML L5/8IN SUBQ PLAS W/ DETACH NDL SFTY

## (undated) DEVICE — SYR 50ML SLIP TIP NSAF LF STRL --

## (undated) DEVICE — SOLUTION IRRIG 1000ML H2O STRL BLT

## (undated) DEVICE — STERILE POLYISOPRENE POWDER-FREE SURGICAL GLOVES: Brand: PROTEXIS

## (undated) DEVICE — FCPS RAD JAW 4LC 240CM W/NDL -- BX/20 RADIAL JAW 4

## (undated) DEVICE — GAUZE,SPONGE,4"X4",16PLY,STRL,LF,10/TRAY: Brand: MEDLINE

## (undated) DEVICE — MEDI-VAC SUCTION HIGH CAPACITY: Brand: CARDINAL HEALTH

## (undated) DEVICE — MEDI-VAC NON-CONDUCTIVE SUCTION TUBING: Brand: CARDINAL HEALTH

## (undated) DEVICE — FLEX ADVANTAGE 3000CC: Brand: FLEX ADVANTAGE

## (undated) DEVICE — FLUFF AND POLYMER UNDERPAD,EXTRA HEAVY: Brand: WINGS

## (undated) DEVICE — BITE BLOCK ENDOSCP UNIV AD 6 TO 9.4 MM

## (undated) DEVICE — ENDOSCOPY PUMP TUBING/ CAP SET: Brand: ERBE